# Patient Record
Sex: FEMALE | Race: WHITE | Employment: FULL TIME | URBAN - METROPOLITAN AREA
[De-identification: names, ages, dates, MRNs, and addresses within clinical notes are randomized per-mention and may not be internally consistent; named-entity substitution may affect disease eponyms.]

---

## 2018-02-01 LAB — DIABETIC RETINOPATHY: ABNORMAL

## 2018-04-25 LAB
AVERAGE GLUCOSE: ABNORMAL
HBA1C MFR BLD: 10.9 %
T4 FREE: 0.92
TSH SERPL DL<=0.05 MIU/L-ACNC: 2.65 UIU/ML
VITAMIN B-12: 272

## 2018-05-03 ENCOUNTER — TELEPHONE (OUTPATIENT)
Dept: ENDOCRINOLOGY | Age: 63
End: 2018-05-03

## 2018-05-03 ENCOUNTER — OFFICE VISIT (OUTPATIENT)
Dept: ENDOCRINOLOGY | Age: 63
End: 2018-05-03

## 2018-05-03 VITALS
WEIGHT: 257.6 LBS | SYSTOLIC BLOOD PRESSURE: 110 MMHG | OXYGEN SATURATION: 98 % | HEIGHT: 61 IN | BODY MASS INDEX: 48.64 KG/M2 | DIASTOLIC BLOOD PRESSURE: 68 MMHG | HEART RATE: 85 BPM

## 2018-05-03 DIAGNOSIS — Z79.4 TYPE 2 DIABETES MELLITUS WITH DIABETIC POLYNEUROPATHY, WITH LONG-TERM CURRENT USE OF INSULIN (HCC): Primary | ICD-10-CM

## 2018-05-03 DIAGNOSIS — M85.859 OSTEOPENIA OF THIGH, UNSPECIFIED LATERALITY: ICD-10-CM

## 2018-05-03 DIAGNOSIS — E11.42 TYPE 2 DIABETES MELLITUS WITH DIABETIC POLYNEUROPATHY, WITH LONG-TERM CURRENT USE OF INSULIN (HCC): Primary | ICD-10-CM

## 2018-05-03 PROCEDURE — 99215 OFFICE O/P EST HI 40 MIN: CPT | Performed by: INTERNAL MEDICINE

## 2018-05-03 PROCEDURE — G0444 DEPRESSION SCREEN ANNUAL: HCPCS | Performed by: INTERNAL MEDICINE

## 2018-05-03 RX ORDER — FERROUS SULFATE TAB EC 324 MG (65 MG FE EQUIVALENT) 324 (65 FE) MG
TABLET DELAYED RESPONSE ORAL
COMMUNITY

## 2018-05-03 RX ORDER — OMEPRAZOLE 20 MG/1
20 CAPSULE, DELAYED RELEASE ORAL DAILY
COMMUNITY
Start: 2017-05-16

## 2018-05-03 RX ORDER — FLUTICASONE FUROATE AND VILANTEROL 100; 25 UG/1; UG/1
POWDER RESPIRATORY (INHALATION)
COMMUNITY
Start: 2017-12-27

## 2018-05-03 RX ORDER — LEVOTHYROXINE SODIUM 112 UG/1
112 TABLET ORAL
Qty: 30 TABLET | Refills: 5 | Status: SHIPPED | OUTPATIENT
Start: 2018-05-03 | End: 2018-09-06 | Stop reason: SDUPTHER

## 2018-05-03 RX ORDER — ALBUTEROL SULFATE 2.5 MG/3ML
2.5 SOLUTION RESPIRATORY (INHALATION)
COMMUNITY
Start: 2018-01-16

## 2018-05-03 RX ORDER — ARIPIPRAZOLE 10 MG/1
10 TABLET ORAL
COMMUNITY
End: 2018-09-06

## 2018-05-03 RX ORDER — ATORVASTATIN CALCIUM 20 MG/1
20 TABLET, FILM COATED ORAL
COMMUNITY
Start: 2017-12-05

## 2018-05-03 RX ORDER — PEN NEEDLE, DIABETIC 30 GX3/16"
NEEDLE, DISPOSABLE MISCELLANEOUS
COMMUNITY
Start: 2016-10-04 | End: 2018-08-03 | Stop reason: SDUPTHER

## 2018-05-03 RX ORDER — ALBUTEROL SULFATE 90 UG/1
2 AEROSOL, METERED RESPIRATORY (INHALATION)
COMMUNITY
Start: 2017-09-29

## 2018-05-03 RX ORDER — DIAZEPAM 2 MG/1
2 TABLET ORAL PRN
COMMUNITY

## 2018-05-03 ASSESSMENT — PATIENT HEALTH QUESTIONNAIRE - PHQ9
6. FEELING BAD ABOUT YOURSELF - OR THAT YOU ARE A FAILURE OR HAVE LET YOURSELF OR YOUR FAMILY DOWN: 1
4. FEELING TIRED OR HAVING LITTLE ENERGY: 3
7. TROUBLE CONCENTRATING ON THINGS, SUCH AS READING THE NEWSPAPER OR WATCHING TELEVISION: 1
5. POOR APPETITE OR OVEREATING: 0
1. LITTLE INTEREST OR PLEASURE IN DOING THINGS: 3
8. MOVING OR SPEAKING SO SLOWLY THAT OTHER PEOPLE COULD HAVE NOTICED. OR THE OPPOSITE, BEING SO FIGETY OR RESTLESS THAT YOU HAVE BEEN MOVING AROUND A LOT MORE THAN USUAL: 0
SUM OF ALL RESPONSES TO PHQ QUESTIONS 1-9: 11
9. THOUGHTS THAT YOU WOULD BE BETTER OFF DEAD, OR OF HURTING YOURSELF: 0
10. IF YOU CHECKED OFF ANY PROBLEMS, HOW DIFFICULT HAVE THESE PROBLEMS MADE IT FOR YOU TO DO YOUR WORK, TAKE CARE OF THINGS AT HOME, OR GET ALONG WITH OTHER PEOPLE: 1
SUM OF ALL RESPONSES TO PHQ9 QUESTIONS 1 & 2: 6
2. FEELING DOWN, DEPRESSED OR HOPELESS: 3
3. TROUBLE FALLING OR STAYING ASLEEP: 0

## 2018-05-03 ASSESSMENT — ENCOUNTER SYMPTOMS: VISUAL CHANGE: 0

## 2018-08-03 RX ORDER — PEN NEEDLE, DIABETIC 30 GX3/16"
NEEDLE, DISPOSABLE MISCELLANEOUS
Qty: 150 EACH | Refills: 3 | Status: SHIPPED | OUTPATIENT
Start: 2018-08-03 | End: 2020-05-04 | Stop reason: SDUPTHER

## 2018-08-28 ENCOUNTER — TELEPHONE (OUTPATIENT)
Dept: ENDOCRINOLOGY | Age: 63
End: 2018-08-28

## 2018-08-31 LAB
AVERAGE GLUCOSE: ABNORMAL
AVERAGE GLUCOSE: ABNORMAL
CHOLESTEROL, TOTAL: 146 MG/DL
CHOLESTEROL/HDL RATIO: 3.4
HBA1C MFR BLD: 9.4 %
HBA1C MFR BLD: 9.4 %
HDLC SERPL-MCNC: 43 MG/DL (ref 35–70)
LDL CHOLESTEROL CALCULATED: 85 MG/DL (ref 0–160)
TRIGL SERPL-MCNC: 90 MG/DL
TSH SERPL DL<=0.05 MIU/L-ACNC: 0.92 UIU/ML
VITAMIN D 25-HYDROXY: 38
VITAMIN D2, 25 HYDROXY: NORMAL
VITAMIN D3,25 HYDROXY: NORMAL
VLDLC SERPL CALC-MCNC: ABNORMAL MG/DL

## 2018-09-06 ENCOUNTER — OFFICE VISIT (OUTPATIENT)
Dept: ENDOCRINOLOGY | Age: 63
End: 2018-09-06

## 2018-09-06 VITALS
BODY MASS INDEX: 47.65 KG/M2 | HEIGHT: 61 IN | WEIGHT: 252.4 LBS | SYSTOLIC BLOOD PRESSURE: 113 MMHG | DIASTOLIC BLOOD PRESSURE: 74 MMHG | HEART RATE: 77 BPM

## 2018-09-06 DIAGNOSIS — E06.3 HYPOTHYROIDISM DUE TO HASHIMOTO'S THYROIDITIS: ICD-10-CM

## 2018-09-06 DIAGNOSIS — I10 ESSENTIAL HYPERTENSION: ICD-10-CM

## 2018-09-06 DIAGNOSIS — E03.8 HYPOTHYROIDISM DUE TO HASHIMOTO'S THYROIDITIS: ICD-10-CM

## 2018-09-06 DIAGNOSIS — E78.2 MIXED HYPERLIPIDEMIA: ICD-10-CM

## 2018-09-06 PROCEDURE — 3017F COLORECTAL CA SCREEN DOC REV: CPT | Performed by: INTERNAL MEDICINE

## 2018-09-06 PROCEDURE — G8417 CALC BMI ABV UP PARAM F/U: HCPCS | Performed by: INTERNAL MEDICINE

## 2018-09-06 PROCEDURE — 3046F HEMOGLOBIN A1C LEVEL >9.0%: CPT | Performed by: INTERNAL MEDICINE

## 2018-09-06 PROCEDURE — 1036F TOBACCO NON-USER: CPT | Performed by: INTERNAL MEDICINE

## 2018-09-06 PROCEDURE — 99214 OFFICE O/P EST MOD 30 MIN: CPT | Performed by: INTERNAL MEDICINE

## 2018-09-06 PROCEDURE — G8427 DOCREV CUR MEDS BY ELIG CLIN: HCPCS | Performed by: INTERNAL MEDICINE

## 2018-09-06 PROCEDURE — 2022F DILAT RTA XM EVC RTNOPTHY: CPT | Performed by: INTERNAL MEDICINE

## 2018-09-06 RX ORDER — LEVOTHYROXINE SODIUM 112 UG/1
112 TABLET ORAL
Qty: 30 TABLET | Refills: 5 | Status: SHIPPED | OUTPATIENT
Start: 2018-09-06 | End: 2020-05-25

## 2018-09-06 ASSESSMENT — ENCOUNTER SYMPTOMS: VISUAL CHANGE: 0

## 2018-09-06 NOTE — PROGRESS NOTES
Hilario Malik is a 61 y.o. female Pt seen for T2 DM diagnosed in 6280 she has complication retinopathy , neuropathy  her initial presentation to me was confusion and mental status changes due to metabolic hyperglycemic changes    Prisca Perez has tried oral hypoglycemic agents in the past including  metformin ,Glipizide and Januvia ,she was on a Insulin 70/30 combination and ended up having frequent  hypoglycemic reactions --- her confusion on initial presentation  resolved after initiation of insulin basal bolus form in January 2010     Pt also has hypothyroidism & is on Lt4 daily --she has no family hx of thyroid disease or thyroid cancer she denies any radiation exposure. Prisca Perez also has hypertension and CAD and follows with Cardiology     She had lung infection in sept 2017 and she was on steroids and that messed up her control too but she admits she has not been watching her diet either   Current diabetic medications include: basal bolus insulin regimen     INTERIM:    Diabetes   She presents for her follow-up diabetic visit. She has type 2 diabetes mellitus. No MedicAlert identification noted. The initial diagnosis of diabetes was made 25 years ago. Her disease course has been worsening. Hypoglycemia symptoms include nervousness/anxiousness, sweats and tremors. Pertinent negatives for diabetes include no foot ulcerations, no polydipsia, no polyphagia, no polyuria, no visual change, no weakness and no weight loss. There are no hypoglycemic complications. Symptoms are worsening. Diabetic complications include heart disease, peripheral neuropathy and retinopathy. Risk factors for coronary artery disease include diabetes mellitus, dyslipidemia, post-menopausal and hypertension. Current diabetic treatment includes intensive insulin program. She is compliant with treatment most of the time. Her weight is stable. She is following a generally healthy diet. Meal planning includes avoidance of concentrated sweets.  She has had Social History Main Topics    Smoking status: Never Smoker    Smokeless tobacco: Never Used    Alcohol use No    Drug use: No    Sexual activity: Not on file     Other Topics Concern    Not on file     Social History Narrative    No narrative on file     Family History   Problem Relation Age of Onset    Diabetes Father     High Blood Pressure Father     Breast Cancer Sister     High Blood Pressure Sister     Diabetes Maternal Grandmother      Current Outpatient Prescriptions   Medication Sig Dispense Refill    insulin glargine (BASAGLAR KWIKPEN) 100 UNIT/ML injection pen Inject 50 Units into the skin nightly 5 pen 5    insulin lispro (HUMALOG) 100 UNIT/ML pen Inject 10 Units into the skin 3 times daily 5 pen 5    levothyroxine (SYNTHROID) 112 MCG tablet Take 1 tablet by mouth every morning (before breakfast) 30 tablet 5    Insulin Pen Needle (PEN NEEDLES) 31G X 5 MM MISC 4 times daily 150 each 3    verapamil (CALAN SR) 180 MG extended release tablet Take 180 mg by mouth 2 times daily      omeprazole (PRILOSEC) 20 MG delayed release capsule Take 20 mg by mouth daily      Cholecalciferol (VITAMIN D3 PO) Take 50,000 Units by mouth once a week      ferrous sulfate 324 (65 Fe) MG EC tablet Take 2 tablets daily      diazepam (VALIUM) 2 MG tablet Take 2 mg by mouth as needed. Kat Goes fluticasone-vilanterol (BREO ELLIPTA) 100-25 MCG/INH AEPB inhaler INHALE ONE DOSE BY MOUTH DAILY      atorvastatin (LIPITOR) 20 MG tablet Take 20 mg by mouth      aspirin 81 MG tablet Take 81 mg by mouth daily      albuterol sulfate  (90 Base) MCG/ACT inhaler Inhale 2 puffs into the lungs      albuterol (PROVENTIL) (2.5 MG/3ML) 0.083% nebulizer solution Inhale 2.5 mg into the lungs      glucose blood VI test strips (FREESTYLE LITE) strip 4 times daily 150 each 3    cyanocobalamin 2000 MCG tablet Take 2,000 mcg by mouth daily.  DULoxetine (CYMBALTA) 60 MG capsule Take 60 mg by mouth 2 times daily.      

## 2018-09-12 ENCOUNTER — TELEPHONE (OUTPATIENT)
Dept: ENDOCRINOLOGY | Age: 63
End: 2018-09-12

## 2018-11-20 RX ORDER — LEVOTHYROXINE SODIUM 112 UG/1
TABLET ORAL
Qty: 30 TABLET | Refills: 4 | Status: SHIPPED | OUTPATIENT
Start: 2018-11-20 | End: 2019-04-20 | Stop reason: SDUPTHER

## 2019-01-01 LAB — DIABETIC RETINOPATHY: POSITIVE

## 2019-01-10 ENCOUNTER — OFFICE VISIT (OUTPATIENT)
Dept: ENDOCRINOLOGY | Age: 64
End: 2019-01-10
Payer: COMMERCIAL

## 2019-01-10 VITALS
WEIGHT: 252.8 LBS | HEART RATE: 69 BPM | SYSTOLIC BLOOD PRESSURE: 133 MMHG | HEIGHT: 61 IN | DIASTOLIC BLOOD PRESSURE: 66 MMHG | BODY MASS INDEX: 47.73 KG/M2

## 2019-01-10 DIAGNOSIS — I10 ESSENTIAL HYPERTENSION: ICD-10-CM

## 2019-01-10 DIAGNOSIS — E78.2 MIXED HYPERLIPIDEMIA: ICD-10-CM

## 2019-01-10 DIAGNOSIS — N18.4 CKD (CHRONIC KIDNEY DISEASE) STAGE 4, GFR 15-29 ML/MIN (HCC): ICD-10-CM

## 2019-01-10 PROCEDURE — 99214 OFFICE O/P EST MOD 30 MIN: CPT | Performed by: INTERNAL MEDICINE

## 2019-01-10 PROCEDURE — 3046F HEMOGLOBIN A1C LEVEL >9.0%: CPT | Performed by: INTERNAL MEDICINE

## 2019-01-10 PROCEDURE — 2022F DILAT RTA XM EVC RTNOPTHY: CPT | Performed by: INTERNAL MEDICINE

## 2019-01-10 PROCEDURE — G8484 FLU IMMUNIZE NO ADMIN: HCPCS | Performed by: INTERNAL MEDICINE

## 2019-01-10 PROCEDURE — G8417 CALC BMI ABV UP PARAM F/U: HCPCS | Performed by: INTERNAL MEDICINE

## 2019-01-10 PROCEDURE — 1036F TOBACCO NON-USER: CPT | Performed by: INTERNAL MEDICINE

## 2019-01-10 PROCEDURE — G8427 DOCREV CUR MEDS BY ELIG CLIN: HCPCS | Performed by: INTERNAL MEDICINE

## 2019-01-10 PROCEDURE — 3017F COLORECTAL CA SCREEN DOC REV: CPT | Performed by: INTERNAL MEDICINE

## 2019-01-10 ASSESSMENT — ENCOUNTER SYMPTOMS: VISUAL CHANGE: 0

## 2019-01-21 ENCOUNTER — TELEPHONE (OUTPATIENT)
Dept: ENDOCRINOLOGY | Age: 64
End: 2019-01-21

## 2019-01-21 DIAGNOSIS — R79.89 ELEVATED SERUM CREATININE: Primary | ICD-10-CM

## 2019-04-22 RX ORDER — LEVOTHYROXINE SODIUM 112 UG/1
TABLET ORAL
Qty: 30 TABLET | Refills: 3 | Status: SHIPPED | OUTPATIENT
Start: 2019-04-22 | End: 2019-07-27 | Stop reason: SDUPTHER

## 2019-06-05 ENCOUNTER — PROCEDURE VISIT (OUTPATIENT)
Dept: ENDOCRINOLOGY | Age: 64
End: 2019-06-05
Payer: COMMERCIAL

## 2019-06-05 ENCOUNTER — OFFICE VISIT (OUTPATIENT)
Dept: ENDOCRINOLOGY | Age: 64
End: 2019-06-05
Payer: COMMERCIAL

## 2019-06-05 VITALS
SYSTOLIC BLOOD PRESSURE: 124 MMHG | WEIGHT: 244.8 LBS | DIASTOLIC BLOOD PRESSURE: 62 MMHG | HEART RATE: 71 BPM | BODY MASS INDEX: 46.22 KG/M2 | HEIGHT: 61 IN | OXYGEN SATURATION: 99 %

## 2019-06-05 VITALS
WEIGHT: 244 LBS | SYSTOLIC BLOOD PRESSURE: 124 MMHG | OXYGEN SATURATION: 99 % | HEIGHT: 61 IN | HEART RATE: 71 BPM | BODY MASS INDEX: 46.07 KG/M2 | DIASTOLIC BLOOD PRESSURE: 62 MMHG

## 2019-06-05 DIAGNOSIS — E66.9 OBESITY (BMI 35.0-39.9 WITHOUT COMORBIDITY): ICD-10-CM

## 2019-06-05 DIAGNOSIS — E04.2 MULTINODULAR GOITER: ICD-10-CM

## 2019-06-05 DIAGNOSIS — N18.30 CKD (CHRONIC KIDNEY DISEASE) STAGE 3, GFR 30-59 ML/MIN (HCC): ICD-10-CM

## 2019-06-05 DIAGNOSIS — E78.2 MIXED HYPERLIPIDEMIA: ICD-10-CM

## 2019-06-05 DIAGNOSIS — I10 ESSENTIAL HYPERTENSION: ICD-10-CM

## 2019-06-05 PROCEDURE — 3017F COLORECTAL CA SCREEN DOC REV: CPT | Performed by: INTERNAL MEDICINE

## 2019-06-05 PROCEDURE — 3046F HEMOGLOBIN A1C LEVEL >9.0%: CPT | Performed by: INTERNAL MEDICINE

## 2019-06-05 PROCEDURE — 1036F TOBACCO NON-USER: CPT | Performed by: INTERNAL MEDICINE

## 2019-06-05 PROCEDURE — G8428 CUR MEDS NOT DOCUMENT: HCPCS | Performed by: INTERNAL MEDICINE

## 2019-06-05 PROCEDURE — G8417 CALC BMI ABV UP PARAM F/U: HCPCS | Performed by: INTERNAL MEDICINE

## 2019-06-05 PROCEDURE — 99214 OFFICE O/P EST MOD 30 MIN: CPT | Performed by: INTERNAL MEDICINE

## 2019-06-05 PROCEDURE — 2022F DILAT RTA XM EVC RTNOPTHY: CPT | Performed by: INTERNAL MEDICINE

## 2019-06-05 RX ORDER — VALSARTAN 80 MG/1
TABLET ORAL
COMMUNITY
Start: 2019-05-22 | End: 2020-08-13

## 2019-06-05 NOTE — PROGRESS NOTES
Thyroid Ultrasound Examination    Technique: A sonogram of the thyroid gland was performed assessing gray-scale appearance and color doppler flow. Comparison:   5/2017 done at [address] [place] [address]     Findings    Thyroid is noted to be homogenous in texture . Measurements are in the following order longitudinal x Anteroposterior x transverse  in cm     The Right lobe is normal in size measuring [3.76 ] x [1.3 to ] x [ 1.41] cm. volume of 3.35 ML    The Left  lobe is normal in size measuring [3.24 ] x [0.77 ] x [ 1.73] cm. In the Right thyroid lobe there are 2 cysts noted which are both approximately 0.3 centimeter , more close to isthmus there is a nodule  Is solid ,  oval shape, measuring 0.44x 0.47cm . Margins are regular , there is no halo, possible echogenic foci, and grade 1 flow/Doppler. In the left lobe there are 2 nodules appreciated  Superiorly and laterally is a solid hypoechoic nodule measuring 0.63x 0.39x 0.65. It has irregular margins with no microcalcifications identified it has grade 0 low on color Doppler. No suspicious features---previous measurements for this nodule   \"In the left lobe superiorly in the mid lobe there is a hypoecheoic nodule with smooth margins measuring 0.58 x0.65 x0.41 cm with grade zero floe In octo 2015 it measured 0.49 x0.40 x 0.26 cm there is  a echogenic focus in this nodule, grade 0 flow smooth margins in sept 2014 0.37 x0.31x0.19)\"     Inferior and medial to this nodule is another solid hypoechoic/isochoeic nodule with irregular margins, no calcification and grade 1 color flow Doppler---Previous measurements \"Inferiorly ( in the mid lobe ) close to trachea is the dominant nodule  It has smooth borders with grade 0 flow May 2017 >. 0.63 x0.57 x0.37 cm >>oc 2015 >> 0.74x 0.65 x 0.35 cm ( previous in sept 2014 >> 0.57x0.56x0.37 cm ) this has increased slightly in size \"    Impression     --- Normal sized thyroid gland. --In the left lobe there are 2 solid nodules.  Both of them are stable when compared to the previous imaging in 2017. There are no suspicious features at this time. We will consider imaging again based on clinical findings. There is no family history of thyroid cancer no history of radiation exposure.

## 2019-06-12 ASSESSMENT — ENCOUNTER SYMPTOMS: VISUAL CHANGE: 0

## 2019-06-12 NOTE — PROGRESS NOTES
Jonna Vargas is a 59 y.o. female seen for T2 DM diagnosed in 5661 she has complication retinopathy , neuropathy  her initial presentation to me was confusion and mental status changes due to metabolic hyperglycemic changes    Desmond Tijerina has tried oral hypoglycemic agents in the past including  metformin ,Glipizide and Januvia ,she was on a Insulin 70/30 combination and ended up having frequent  hypoglycemic reactions --- her confusion on initial presentation  resolved after initiation of insulin basal bolus form in January 2010     Pt also has hypothyroidism & is on Lt4 daily --she has no family hx of thyroid disease or thyroid cancer she denies any radiation exposure. Desmond Tijerina also has hypertension and CAD and follows with Cardiology     She had lung infection in sept 2017 and she was on steroids and that messed up her control too but she admits she has not been watching her diet either   Current diabetic medications include: basal bolus insulin regimen     INTERIM:    Diabetes   She presents for her follow-up diabetic visit. She has type 2 diabetes mellitus. No MedicAlert identification noted. The initial diagnosis of diabetes was made 25 years ago. Her disease course has been worsening. Hypoglycemia symptoms include nervousness/anxiousness, sweats and tremors. Pertinent negatives for diabetes include no foot ulcerations, no polydipsia, no polyphagia, no polyuria, no visual change, no weakness and no weight loss. There are no hypoglycemic complications. Symptoms are worsening. Diabetic complications include heart disease, peripheral neuropathy and retinopathy. Risk factors for coronary artery disease include diabetes mellitus, dyslipidemia, post-menopausal and hypertension. Current diabetic treatment includes intensive insulin program. She is compliant with treatment most of the time. Her weight is stable. She is following a generally healthy diet. Meal planning includes avoidance of concentrated sweets.  She has had a previous visit with a dietitian. She rarely participates in exercise. Her home blood glucose trend is increasing steadily. Her breakfast blood glucose is taken between 7-8 am. Her breakfast blood glucose range is generally  mg/dl. Her lunch blood glucose is taken between 12-1 pm. Her lunch blood glucose range is generally >200 mg/dl. Her dinner blood glucose is taken between 7-8 pm. Her dinner blood glucose range is generally >200 mg/dl. An ACE inhibitor/angiotensin II receptor blocker is being taken. She does not see a podiatrist.Eye exam is current.       No recent change in her symptoms she has been watching her diet and has noted improvement in her fingersticks in the last few weeks  Weight trend: stable  Prior visit with dietician: no  Current diet: on average, 3 meals per day  Current exercise: rare    Has there been any hospitalization, surgery or major illness since the last visit? No   Has there been any new school, family or social problems since the last visit? No  Has there been any new family history of members with diabetes, heart disease, stroke, or endocrine related problems since the last visit?   No    Past Medical History:   Diagnosis Date    Anemia     Anxiety     Arthritis     Asthma     Chronic renal [facility], stage [facility]     CKD (chronic kidney disease) stage 3, GFR 30-59 ml/min ([facility]) 6/5/2019    Depression     Diabetes mellitus ([address]     Hyperlipidemia     Hypertension     Hypothyroidism     Intercostal pain     Iron deficiency anemia, unspecified     Neuropathy     Obesity     Osteoarthritis     Retinopathy     bilat eyes     Thyroid disease       Patient Active Problem List   Diagnosis    Uncontrolled type 2 diabetes mellitus with complication, with long-term current use of insulin ([facility])    Essential hypertension    Mixed hyperlipidemia    Obesity (BMI 35.0-39.9 without comorbidity)    CKD (chronic kidney disease) stage 3, GFR 30-59 ml/min ([facility])     Past Surgical History:   Procedure Laterality Date    CHOLECYSTECTOMY      EYE SURGERY  2018    retinopathy     HERNIA REPAIR      OTHER SURGICAL HISTORY Right     thoracic surgery on rt side for empyema    OTHER SURGICAL HISTORY  2018    nerve block- back     OTHER SURGICAL HISTORY  03/2019    ablation back bilateral     TONSILLECTOMY       Social History     Socioeconomic History    Marital status:      Spouse name: Not on file    Number of children: Not on file    Years of education: Not on file    Highest education level: Not on file   Occupational History    Not on file   Social Needs    Financial resource strain: Not on file    Food insecurity:     Worry: Not on file     Inability: Not on file    Transportation needs:     Medical: Not on file     Non-medical: Not on file   Tobacco Use    Smoking status: Never Smoker    Smokeless tobacco: Never Used   Substance and Sexual Activity    Alcohol use: No    Drug use: No    Sexual activity: Not on file   Lifestyle    Physical activity:     Days per week: Not on file     Minutes per session: Not on file    Stress: Not on file   Relationships    Social connections:     Talks on phone: Not on file     Gets together: Not on file     Attends Hinduism service: Not on file     Active member of club or organization: Not on file     Attends meetings of clubs or organizations: Not on file     Relationship status: Not on file    Intimate partner violence:     Fear of current or ex partner: Not on file     Emotionally abused: Not on file     Physically abused: Not on file     Forced sexual activity: Not on file   Other Topics Concern    Not on file   Social History Narrative    Not on file     Family History   Problem Relation Age of Onset    Diabetes Father     High Blood Pressure Father     Breast Cancer Sister     High Blood Pressure Sister     Diabetes Maternal Grandmother      Current Outpatient Medications   Medication Sig Dispense Refill  valsartan (DIOVAN) 80 MG tablet 1 tab daily      levothyroxine (SYNTHROID) 112 MCG tablet TAKE ONE TABLET BY MOUTH EVERY MORNING BEFORE BREAKFAST 30 tablet 3    insulin glargine (BASAGLAR KWIKPEN) 100 UNIT/ML injection pen Inject 50 Units into the skin nightly 5 pen 5    insulin lispro (HUMALOG) 100 UNIT/ML pen Inject 10 Units into the skin 3 times daily 5 pen 5    levothyroxine (SYNTHROID) 112 MCG tablet Take 1 tablet by mouth every morning (before breakfast) 30 tablet 5    Insulin Pen Needle (PEN NEEDLES) 31G X 5 MM MISC 4 times daily 150 each 3    verapamil (CALAN SR) 180 MG extended release tablet Take 180 mg by mouth 2 times daily      omeprazole (PRILOSEC) 20 MG delayed release capsule Take 20 mg by mouth daily      Cholecalciferol (VITAMIN D3 PO) Take 50,000 Units by mouth once a week      ferrous sulfate 324 (65 Fe) MG EC tablet Take 2 tablets daily      diazepam (VALIUM) 2 MG tablet Take 2 mg by mouth as needed. Raejean Blank fluticasone-vilanterol (BREO ELLIPTA) 100-25 MCG/INH AEPB inhaler INHALE ONE DOSE BY MOUTH DAILY      atorvastatin (LIPITOR) 20 MG tablet Take 20 mg by mouth      aspirin 81 MG tablet Take 81 mg by mouth daily      albuterol sulfate  (90 Base) MCG/ACT inhaler Inhale 2 puffs into the lungs      albuterol (PROVENTIL) (2.5 MG/3ML) 0.083% nebulizer solution Inhale 2.5 mg into the lungs      glucose blood VI test strips (FREESTYLE LITE) strip 4 times daily 150 each 3    cyanocobalamin 2000 MCG tablet Take 2,000 mcg by mouth daily.  DULoxetine (CYMBALTA) 60 MG capsule Take 60 mg by mouth 2 times daily.  fexofenadine (ALLEGRA ALLERGY) 180 MG tablet Take 180 mg by mouth daily.  furosemide (LASIX) 40 MG tablet Take 40 mg by mouth daily.  Multiple Vitamins-Minerals (MULTIVITAMIN PO) Take  by mouth.  pregabalin (LYRICA) 100 MG capsule Take 100 mg by mouth daily. .       No current facility-administered medications for this visit.       No Known Allergies  Family Status   Relation Name Status    Father  (Not Specified)    Sister  (Not Specified)    MGM  (Not Specified)       Review of Systems  Constitutional: no fatigue, no fever, no recent weight gain, no recent weight loss, no changes in appetite  Eyes: no eye pain, no change in vision, no eye redness, no eye irritation, no double vision  Ears, nose, throat: no nasal congestion, no sore throat, no earache, no decrease in hearing, no hoarseness, no dry mouth, no sinus problems, no difficulty swallowing, no neck lumps, no dental problems, no mouth sores, no ringing in ears  Pulmonary: no shortness of breath, no wheezing, no dyspnea on exertion, no cough  Cardiovascular: no chest pain, no lower extremity edema, no orthopnea, no intermittent leg claudication, no palpitations  Gastrointestinal: no abdominal pain, no nausea, no vomiting, no diarrhea, no constipation, no dysphagia, no heartburn, no bloating  Genitourinary: no dysuria, no urinary incontinence, no urinary hesitancy, no urinary frequency, no feelings of urinary urgency, no nocturia  Musculoskeletal: no joint swelling, no joint stiffness, no joint pain, no muscle cramps, no muscle pain, no bone pain, no fractures  Integument/Breast: hair loss, but no skin rashes, no skin lesions, no itching, no dry skin, no breast pain, no breast mass, no skin hives, no skin discoloration, no nipple discharge  Neurological: no numbness, no tingling, no weakness, no confusion, no headaches, no dizziness, no fainting, no tremors, no decrease in memory, no balance problems  Psychiatric: no anxiety, no depression, no insomnia, no change in personality, no emotional problems, no stress  Hematologic/Lymphatic: no tendency for easy bleeding, no swollen lymph nodes, no tendency for easy bruising  Immunology: no seasonal allergies, no frequent infections, no frequent illnesses  Endocrine: no temperature intolerance no hirsutism, no hot flashes    OBJECTIVE:  /62 left lobe superiorly in the mid lobe there is a hypoecheoic nodule with smooth margins measuring 0.58 x0.65 x0.41 cm with grade zero floe In octo 2015 it measured 0.49 x0.40 x 0.26 cm there is  a echogenic focus in this nodule, grade 0 flow smooth margins in sept 2014 0.37 x0.31x0.19)  Inferiorly ( in the mid lobe ) close to trachea is the dominant nodule  It has smooth borders with grade 0 flow and measures 0.63 x0.57 x0.37 cm >>oc 2015 >> 0.74x 0.65 x 0.35 cm ( previous in sept 2014 >> 0.57x0.56x0.37 cm ) this has increased slightly in size     IMPRESSION    1. Normal sized thyroid gland.    2. Left lobe subcentimeter dominant  nodule which has stayed stable   3 . All nodules less than 1 cm   Will continue to monitor         Lab Results   Component Value Date    LABA1C 9.4 08/31/2018    LABA1C 9.4 08/31/2018    LABA1C 10.9 04/25/2018         ASSESSMENT/PLAN:    T2DM with triopathy uncontrolled   A1c was 7.8>>8.5>>7.3>>8.6---control worsened Since last visit>>8.5 >>10.9>>9.4>>8.5>>9.4>>8.7    -She is on Basaglar 40  Units >>> novolog 6 with sliding scale. Better blood glucose in the last few weeks   Discussed Diet modification, Pt has been taking insulin as prescribed and denies having any unexplained hypoglycemic reactions. Pt is able to recognize hypoglycemia and correct it, denies any worsening of neuropathy  Hypoglycemia protocol reviewed in detail with patient Patient was advised to carry glucose tablets .  She lives by herself so she was not able to use Glucagon   Test 4 times a day      Health Maintenance Review:  Last Eye Exam:sept 2018 she had laser done for retinopathy again in 2018   Lipids: Normal ldl   Microalbumin/Creatinine Ratio: Pt has CKD and follows with Dr Doris Marrufo exam marked decrease in vibration sensation and monofilament was intact --- may 2018      Reviewed ABCs of diabetes management (respective goals in parentheses): A1C (<7), blood pressure (<130/80), and cholesterol (LDL <100).      Hypothyroidism   . Levothyroxine 112 mcg daily TSH one in June 2019  Umm Becker is currently biochemicaly  and clinically euthyroid on the  current dose of thyroid hormone replacement . Patient  is advised to take her thyroid hormone supplement on empty stomach without any concomitant Iron or calcium supplement . VIt D level 32 >>28>>35  She has been on 50,0000 Weekly dosing   Denies any kidney stones   She is on replacement   Most recent level in June 2019 is 35    THYROID NODULE LEFT LOBE 6 mm solid hypoechoeic nodule   Thyroid ultrasound stable May 2017 she will be scheduled to have a repeat thyroid ultrasound done with me at follow-up      OSTEOPENIA dex scan was in 5/2016 repeat in 2018    she was given orders to have repeat DEXA scan done now     CKD   Follows with Dr Hanh Olmos   Her creatinine is 1.8> 1.4 >>1.99  She will call Dr Cami Guadarrama for abnormal labs     Hyperlipidemia on meds follows with PCP on statins   She is on lipitor only ---ldl 86 >>100>>85  She was on fenofibrate in the past which I stopped in march 2016 due to elevated CPK   CK was 65 in sep 2016     HYPERTENSION on meds controlled   Continue with current regimen     --- CHF   She is on Lasix   Follows with [facility]ilogy she follows with   She has pulmonary hypertension     Asthma stable   ---Follows with Dr. Shawn Boo current regimen       Reviewed and/or ordered clinical lab results yes   Reviewed and/or ordered radiology tests Yes  Reviewed and/or ordered other diagnostic tests yes   Made a decision to obtain old records yes   Reviewed and summarized old records yes     Umm Becker was counseled regarding symptoms of current diagnosis, course and complications of disease if inadequately treated, side effects of medications, diagnosis, treatment options, and prognosis, risks, benefits, complications, and alternatives of treatment, labs, imaging and other studies and treatment targets and goals.   She understands instructions and counseling    Total time spent counseling 25 min  , more than 50 % of this was spent on face to face counseling    These diagnosis were discussed and reviewed with the patient including the advantages of drug therapy. She was counseled at this visit on the following: diabetes complication prevention and foot care. Return in about 3 months (around 9/5/2019).

## 2019-07-23 RX ORDER — INSULIN GLARGINE 100 [IU]/ML
INJECTION, SOLUTION SUBCUTANEOUS
Qty: 15 PEN | Refills: 0 | Status: SHIPPED | OUTPATIENT
Start: 2019-07-23 | End: 2019-08-18 | Stop reason: SDUPTHER

## 2019-07-29 RX ORDER — LEVOTHYROXINE SODIUM 112 UG/1
TABLET ORAL
Qty: 30 TABLET | Refills: 2 | Status: SHIPPED | OUTPATIENT
Start: 2019-07-29 | End: 2019-11-17 | Stop reason: SDUPTHER

## 2019-08-19 RX ORDER — INSULIN LISPRO 100 [IU]/ML
INJECTION, SOLUTION INTRAVENOUS; SUBCUTANEOUS
Qty: 15 PEN | Refills: 2 | Status: SHIPPED | OUTPATIENT
Start: 2019-08-19 | End: 2019-10-08 | Stop reason: SDUPTHER

## 2019-08-19 RX ORDER — INSULIN GLARGINE 100 [IU]/ML
INJECTION, SOLUTION SUBCUTANEOUS
Qty: 15 PEN | Refills: 2 | Status: SHIPPED | OUTPATIENT
Start: 2019-08-19 | End: 2020-07-07

## 2019-10-09 ENCOUNTER — OFFICE VISIT (OUTPATIENT)
Dept: ENDOCRINOLOGY | Age: 64
End: 2019-10-09
Payer: COMMERCIAL

## 2019-10-09 VITALS
WEIGHT: 237.8 LBS | SYSTOLIC BLOOD PRESSURE: 136 MMHG | BODY MASS INDEX: 44.89 KG/M2 | HEART RATE: 75 BPM | DIASTOLIC BLOOD PRESSURE: 78 MMHG | OXYGEN SATURATION: 98 % | HEIGHT: 61 IN

## 2019-10-09 DIAGNOSIS — N18.30 CKD (CHRONIC KIDNEY DISEASE) STAGE 3, GFR 30-59 ML/MIN (HCC): ICD-10-CM

## 2019-10-09 DIAGNOSIS — E66.9 OBESITY (BMI 35.0-39.9 WITHOUT COMORBIDITY): ICD-10-CM

## 2019-10-09 DIAGNOSIS — Z78.0 POSTMENOPAUSAL: ICD-10-CM

## 2019-10-09 DIAGNOSIS — E78.2 MIXED HYPERLIPIDEMIA: ICD-10-CM

## 2019-10-09 DIAGNOSIS — I10 ESSENTIAL HYPERTENSION: ICD-10-CM

## 2019-10-09 PROCEDURE — G8484 FLU IMMUNIZE NO ADMIN: HCPCS | Performed by: INTERNAL MEDICINE

## 2019-10-09 PROCEDURE — G8417 CALC BMI ABV UP PARAM F/U: HCPCS | Performed by: INTERNAL MEDICINE

## 2019-10-09 PROCEDURE — 1036F TOBACCO NON-USER: CPT | Performed by: INTERNAL MEDICINE

## 2019-10-09 PROCEDURE — G8427 DOCREV CUR MEDS BY ELIG CLIN: HCPCS | Performed by: INTERNAL MEDICINE

## 2019-10-09 PROCEDURE — 3017F COLORECTAL CA SCREEN DOC REV: CPT | Performed by: INTERNAL MEDICINE

## 2019-10-09 PROCEDURE — 3046F HEMOGLOBIN A1C LEVEL >9.0%: CPT | Performed by: INTERNAL MEDICINE

## 2019-10-09 PROCEDURE — 2022F DILAT RTA XM EVC RTNOPTHY: CPT | Performed by: INTERNAL MEDICINE

## 2019-10-09 PROCEDURE — 99214 OFFICE O/P EST MOD 30 MIN: CPT | Performed by: INTERNAL MEDICINE

## 2019-10-09 RX ORDER — BUPROPION HYDROCHLORIDE 150 MG/1
300 TABLET ORAL
COMMUNITY
Start: 2019-09-26

## 2019-10-09 RX ORDER — ISOSORBIDE MONONITRATE 60 MG/1
60 TABLET, EXTENDED RELEASE ORAL
COMMUNITY
Start: 2019-09-25 | End: 2022-03-14

## 2019-10-09 ASSESSMENT — ENCOUNTER SYMPTOMS: VISUAL CHANGE: 0

## 2019-11-18 RX ORDER — LEVOTHYROXINE SODIUM 112 UG/1
TABLET ORAL
Qty: 30 TABLET | Refills: 1 | Status: SHIPPED | OUTPATIENT
Start: 2019-11-18 | End: 2020-01-20

## 2020-01-21 RX ORDER — LEVOTHYROXINE SODIUM 112 UG/1
TABLET ORAL
Qty: 30 TABLET | Refills: 3 | Status: SHIPPED
Start: 2020-01-21 | End: 2020-05-04 | Stop reason: SDUPTHER

## 2020-05-04 ENCOUNTER — VIRTUAL VISIT (OUTPATIENT)
Dept: ENDOCRINOLOGY | Age: 65
End: 2020-05-04
Payer: COMMERCIAL

## 2020-05-04 PROCEDURE — 99214 OFFICE O/P EST MOD 30 MIN: CPT | Performed by: INTERNAL MEDICINE

## 2020-05-04 PROCEDURE — 2022F DILAT RTA XM EVC RTNOPTHY: CPT | Performed by: INTERNAL MEDICINE

## 2020-05-04 PROCEDURE — G8427 DOCREV CUR MEDS BY ELIG CLIN: HCPCS | Performed by: INTERNAL MEDICINE

## 2020-05-04 PROCEDURE — 3046F HEMOGLOBIN A1C LEVEL >9.0%: CPT | Performed by: INTERNAL MEDICINE

## 2020-05-04 PROCEDURE — 3017F COLORECTAL CA SCREEN DOC REV: CPT | Performed by: INTERNAL MEDICINE

## 2020-05-04 RX ORDER — PEN NEEDLE, DIABETIC 30 GX3/16"
NEEDLE, DISPOSABLE MISCELLANEOUS
Qty: 150 EACH | Refills: 3 | Status: SHIPPED | OUTPATIENT
Start: 2020-05-04 | End: 2021-08-02 | Stop reason: SDUPTHER

## 2020-05-04 ASSESSMENT — ENCOUNTER SYMPTOMS: VISUAL CHANGE: 0

## 2020-05-04 NOTE — PROGRESS NOTES
negatives for diabetes include no foot ulcerations, no polydipsia, no polyphagia, no polyuria, no visual change, no weakness and no weight loss. There are no hypoglycemic complications. Symptoms are worsening. Diabetic complications include heart disease, peripheral neuropathy and retinopathy. Risk factors for coronary artery disease include diabetes mellitus, dyslipidemia, post-menopausal and hypertension. Current diabetic treatment includes intensive insulin program. She is compliant with treatment most of the time. Her weight is stable. She is following a generally healthy diet. Meal planning includes avoidance of concentrated sweets. She has had a previous visit with a dietitian. She rarely participates in exercise. Her home blood glucose trend is increasing steadily. Her breakfast blood glucose is taken between 7-8 am. Her breakfast blood glucose range is generally  mg/dl. Her lunch blood glucose is taken between 12-1 pm. Her lunch blood glucose range is generally >200 mg/dl. Her dinner blood glucose is taken between 7-8 pm. Her dinner blood glucose range is generally >200 mg/dl. An ACE inhibitor/angiotensin II receptor blocker is being taken. She does not see a podiatrist.Eye exam is current. ---she had stress test done in oct 2019 with no intervention recommended but she was prescribed medication for angina she now follows with Dr. Francisco Bae in cardiology  ---fastings are 120 --130   Before dinner are higher   She has been struggling with pain   Still goes to work and is stressed about corona virus     Weight trend: stable  Prior visit with dietician: no  Current diet: on average, 3 meals per day  Current exercise: rare    Has there been any hospitalization, surgery or major illness since the last visit? yes  Has there been any new school, family or social problems since the last visit?   No  Has there been any new family history of members with diabetes, heart disease, stroke, or endocrine related problems since the last visit?   No    Past Medical History:   Diagnosis Date    Anemia     Anxiety     Arthritis     Asthma     Chronic [facility] disease, stage III ([facility])     CKD (chronic kidney disease) stage 3, GFR 30-59 ml/min ([facility]) 6/5/2019    Depression     Diabetes mellitus ([facility] [address]     History of angina 2019    Hyperlipidemia     Hypertension     Hypothyroidism     Intercostal pain     Iron deficiency anemia, unspecified     Neuropathy     Obesity     Osteoarthritis     Retinopathy     bilat eyes     Thyroid disease       Patient Active Problem List   Diagnosis    Uncontrolled type 2 diabetes mellitus with complication, with long-term current use of insulin ([facility])    Essential hypertension    Mixed hyperlipidemia    Obesity (BMI 35.0-39.9 without comorbidity)    CKD (chronic kidney disease) stage 3, GFR 30-59 ml/min ([facility])     Past Surgical History:   Procedure Laterality Date    CHOLECYSTECTOMY      EYE SURGERY  2018    retinopathy     HERNIA REPAIR      OTHER SURGICAL HISTORY Right     thoracic surgery on rt side for empyema    OTHER SURGICAL HISTORY  2018    nerve block- back     OTHER SURGICAL HISTORY  03/2019    ablation back bilateral     TONSILLECTOMY       Social History     Socioeconomic History    Marital status:      Spouse name: Not on file    Number of children: Not on file    Years of education: Not on file    Highest education level: Not on file   Occupational History    Not on file   Social Needs    Financial resource strain: Not on file    Food insecurity     Worry: Not on file     Inability: Not on file   [employer] needs     Medical: Not on file     Non-medical: Not on file   Tobacco Use    Smoking status: Never Smoker    Smokeless tobacco: Never Used   Substance and Sexual Activity    Alcohol use: No    Drug use: No    Sexual activity: Not on file   Lifestyle    Physical activity     Days per week: Not on file     Minutes per session: Not on Cholecalciferol (VITAMIN D3 PO) Take 50,000 Units by mouth once a week      ferrous sulfate 324 (65 Fe) MG EC tablet Take 2 tablets daily      diazepam (VALIUM) 2 MG tablet Take 2 mg by mouth as needed. Claudette Main fluticasone-vilanterol (BREO ELLIPTA) 100-25 MCG/INH AEPB inhaler INHALE ONE DOSE BY MOUTH DAILY      atorvastatin (LIPITOR) 20 MG tablet Take 20 mg by mouth      aspirin 81 MG tablet Take 81 mg by mouth daily      albuterol sulfate  (90 Base) MCG/ACT inhaler Inhale 2 puffs into the lungs      albuterol (PROVENTIL) (2.5 MG/3ML) 0.083% nebulizer solution Inhale 2.5 mg into the lungs      cyanocobalamin 2000 MCG tablet Take 2,000 mcg by mouth daily.  DULoxetine (CYMBALTA) 60 MG capsule Take 60 mg by mouth 2 times daily.  fexofenadine (ALLEGRA ALLERGY) 180 MG tablet Take 180 mg by mouth daily Pt takes PRN      furosemide (LASIX) 40 MG tablet Take 40 mg by mouth daily.  Multiple Vitamins-Minerals (MULTIVITAMIN PO) Take  by mouth.  pregabalin (LYRICA) 100 MG capsule Take 100 mg by mouth daily. .       No current facility-administered medications for this visit.       No Known Allergies  Family Status   Relation Name Status    Father  (Not Specified)    Sister  (Not Specified)    MGM  (Not Specified)       Review of Systems  Constitutional  Patient denies any weight loss denies any weight gain,  Eyes   Pt denies any double vision blurred vision or decreased peripheral vision  Head ear nose throat  Denies any trouble swallowing denies any hoarseness  Denies any shortness of breath denies  Cardiovascular  Denies any chest pain denies any palpitations currently  Gastrointestinal  Denies any nausea ,vomiting ,constipation or diarrhea  Hematological/lymphatic  Denies any blood clot denies or any painful lymph nodes  Genitourinary  Denies any frequent urination denies any nighttime urination  Skin   Denies any acne denies any changes in facial body hair denies any non healing wounds 0.5 cm cyst in rt lobe     In the left lobe superiorly in the mid lobe there is a hypoecheoic nodule with smooth margins measuring 0.58 x0.65 x0.41 cm with grade zero floe In octo 2015 it measured 0.49 x0.40 x 0.26 cm there is  a echogenic focus in this nodule, grade 0 flow smooth margins in sept 2014 0.37 x0.31x0.19)  Inferiorly ( in the mid lobe ) close to trachea is the dominant nodule  It has smooth borders with grade 0 flow and measures 0.63 x0.57 x0.37 cm >>oc 2015 >> 0.74x 0.65 x 0.35 cm ( previous in sept 2014 >> 0.57x0.56x0.37 cm ) this has increased slightly in size     IMPRESSION    1. Normal sized thyroid gland.    2. Left lobe subcentimeter dominant  nodule which has stayed stable   3 . All nodules less than 1 cm   Will continue to monitor         Lab Results   Component Value Date    LABA1C 9.4 08/31/2018    LABA1C 9.4 08/31/2018    LABA1C 10.9 04/25/2018         ASSESSMENT/PLAN:    T2DM with triopathy uncontrolled   A1c was 7.8>>8.5>>7.3>>8.6---control worsened Since last visit>>8.5 >>10.9>>9.4>>8.5>>9.4>>8.7>>7.9    -She is on Basaglar 35  Units >>> novolog 6 with sliding scale. Better blood glucose in the last few weeks   Increase short acting insulin with meals as she is having lows   Discussed Diet modification, Pt has been taking insulin as prescribed and denies having any unexplained hypoglycemic reactions. Pt is able to recognize hypoglycemia and correct it, denies any worsening of neuropathy  Hypoglycemia protocol reviewed in detail with patient Patient was advised to carry glucose tablets .  She lives by herself so she was not able to use Glucagon   Test 4 times a day      Health Maintenance Review:  Last Eye Exam:aug 2019 s/p laser   Lipids: Normal ldl 87 in June 2019   Microalbumin/Creatinine Ratio: Pt has CKD and follows with Dr Elly Vazquez exam marked decrease in vibration sensation and monofilament was intact --- may 2018      Reviewed ABCs of diabetes management (respective goals in

## 2020-05-25 RX ORDER — LEVOTHYROXINE SODIUM 112 UG/1
TABLET ORAL
Qty: 30 TABLET | Refills: 2 | Status: SHIPPED | OUTPATIENT
Start: 2020-05-25 | End: 2020-08-21

## 2020-07-07 ENCOUNTER — TELEPHONE (OUTPATIENT)
Dept: ENDOCRINOLOGY | Age: 65
End: 2020-07-07

## 2020-07-07 RX ORDER — INSULIN GLARGINE 100 [IU]/ML
INJECTION, SOLUTION SUBCUTANEOUS
Qty: 5 PEN | Refills: 3 | Status: SHIPPED | OUTPATIENT
Start: 2020-07-07 | End: 2021-02-01

## 2020-07-07 NOTE — TELEPHONE ENCOUNTER
Please advise patient that she can be seen in the office if she would want I would recommend that she definitely get her blood work done

## 2020-07-07 NOTE — TELEPHONE ENCOUNTER
Patient reports that ins is no longer covering [address] and would like her to switch to Lantus. She is taking 36 units at night and uses the [facility] on [address].      Would like to know what Dr Janet Garcia thoughts are on going to the lab to get bloodwork done, and having in office visits vs virtual.

## 2020-08-13 ENCOUNTER — OFFICE VISIT (OUTPATIENT)
Dept: ENDOCRINOLOGY | Age: 65
End: 2020-08-13
Payer: COMMERCIAL

## 2020-08-13 PROCEDURE — 2022F DILAT RTA XM EVC RTNOPTHY: CPT | Performed by: INTERNAL MEDICINE

## 2020-08-13 PROCEDURE — G8400 PT W/DXA NO RESULTS DOC: HCPCS | Performed by: INTERNAL MEDICINE

## 2020-08-13 PROCEDURE — 1123F ACP DISCUSS/DSCN MKR DOCD: CPT | Performed by: INTERNAL MEDICINE

## 2020-08-13 PROCEDURE — 4040F PNEUMOC VAC/ADMIN/RCVD: CPT | Performed by: INTERNAL MEDICINE

## 2020-08-13 PROCEDURE — G8427 DOCREV CUR MEDS BY ELIG CLIN: HCPCS | Performed by: INTERNAL MEDICINE

## 2020-08-13 PROCEDURE — 1090F PRES/ABSN URINE INCON ASSESS: CPT | Performed by: INTERNAL MEDICINE

## 2020-08-13 PROCEDURE — 3017F COLORECTAL CA SCREEN DOC REV: CPT | Performed by: INTERNAL MEDICINE

## 2020-08-13 PROCEDURE — 99214 OFFICE O/P EST MOD 30 MIN: CPT | Performed by: INTERNAL MEDICINE

## 2020-08-13 PROCEDURE — 3046F HEMOGLOBIN A1C LEVEL >9.0%: CPT | Performed by: INTERNAL MEDICINE

## 2020-08-13 ASSESSMENT — ENCOUNTER SYMPTOMS: VISUAL CHANGE: 0

## 2020-08-13 NOTE — PROGRESS NOTES
Patient  is a 72 y.o. female seen for T2 DM diagnosed in 3734 she has complication retinopathy , neuropathy  her initial presentation to me was confusion and mental status changes due to metabolic hyperglycemic changes    Bob Morris has tried oral hypoglycemic agents in the past including  metformin ,Glipizide and Januvia ,she was on a Insulin 70/30 combination and ended up having frequent  hypoglycemic reactions --- her confusion on initial presentation  resolved after initiation of insulin basal bolus form in January 2010     Pt also has hypothyroidism & is on Lt4 daily --she has no family hx of thyroid disease or thyroid cancer she denies any radiation exposure. Bob Morris also has hypertension and CAD and follows with Cardiology     She had lung infection in sept 2017 and she was on steroids and that messed up her control too but she admits she has not been watching her diet either   Current diabetic medications include: basal bolus insulin regimen     INTERIM:    Diabetes   She presents for her follow-up diabetic visit. She has type 2 diabetes mellitus. No MedicAlert identification noted. The initial diagnosis of diabetes was made 25 years ago. Her disease course has been worsening. Hypoglycemia symptoms include nervousness/anxiousness, sweats and tremors. Pertinent negatives for diabetes include no foot ulcerations, no polydipsia, no polyphagia, no polyuria, no visual change, no weakness and no weight loss. There are no hypoglycemic complications. Symptoms are worsening. Diabetic complications include heart disease, peripheral neuropathy and retinopathy. Risk factors for coronary artery disease include diabetes mellitus, dyslipidemia, post-menopausal and hypertension. Current diabetic treatment includes intensive insulin program. She is compliant with treatment most of the time. Her weight is stable. She is following a generally healthy diet. Meal planning includes avoidance of concentrated sweets.  She has had a previous visit with a dietitian. She rarely participates in exercise. Her home blood glucose trend is increasing steadily. Her breakfast blood glucose is taken between 7-8 am. Her breakfast blood glucose range is generally  mg/dl. Her lunch blood glucose is taken between 12-1 pm. Her lunch blood glucose range is generally >200 mg/dl. Her dinner blood glucose is taken between 7-8 pm. Her dinner blood glucose range is generally >200 mg/dl. An ACE inhibitor/angiotensin II receptor blocker is being taken. She does not see a podiatrist.Eye exam is current. ---she had stress test done in oct 2019 with no intervention recommended but she was prescribed medication for angina she now follows with Dr. Brigitte Story in cardiology  ---fastings are 100--120   She has been using noom program was able to lose 20 pounds and is now mostly eating veggies with them healthy and is not requiring short-acting insulin    Weight trend: stable  Prior  she has beenvisit with dietician: no  Current diet: on average, 3 meals per day  Current exercise: rare    Has there been any hospitalization, surgery or major illness since the last visit? yes  Has there been any new school, family or social problems since the last visit? No  Has there been any new family history of members with diabetes, heart disease, stroke, or endocrine related problems since the last visit?   No    Past Medical History:   Diagnosis Date    Anemia     Anxiety     Arthritis     Asthma     [facility] stage III ([facility])     CKD (chronic kidney disease) stage 3, GFR 30-59 ml/min ([facility]) 6/5/2019    Depression     Diabetes mellitus ([facility] [address]     History of angina 2019    Hyperlipidemia     Hypertension     Hypothyroidism     Intercostal pain     Iron deficiency anemia, unspecified     Neuropathy     Obesity     Osteoarthritis     Retinopathy     bilat eyes     Thyroid disease       Patient Active Problem List   Diagnosis    Uncontrolled type 2 diabetes mellitus with complication, with long-term current use of insulin ([facility]    Essential hypertension    Mixed hyperlipidemia    Obesity (BMI 35.0-39.9 without comorbidity)    CKD (chronic kidney disease) stage 3, GFR 30-59 ml/min ([facility])     Past Surgical History:   Procedure Laterality Date    CHOLECYSTECTOMY      EYE SURGERY  2018    retinopathy     HERNIA REPAIR      OTHER SURGICAL HISTORY Right     thoracic surgery on rt side for empyema    OTHER SURGICAL HISTORY  2018    nerve block- back     OTHER SURGICAL HISTORY  03/2019    ablation back bilateral     TONSILLECTOMY       Social History     Socioeconomic History    Marital status:      Spouse name: Not on file    Number of children: Not on file    Years of education: Not on file    Highest education level: Not on file   Occupational History    Not on file   Social Needs    Financial resource strain: Not on file    Food insecurity     Worry: Not on file     Inability: Not on file    Transportation needs     Medical: Not on file     Non-medical: Not on file   Tobacco Use    Smoking status: Never Smoker    Smokeless tobacco: Never Used   Substance and Sexual Activity    Alcohol use: No    Drug use: No    Sexual activity: Not on file   Lifestyle    Physical activity     Days per week: Not on file     Minutes per session: Not on file    Stress: Not on file   Relationships    Social connections     Talks on phone: Not on file     Gets together: Not on file     Attends Islam service: Not on file     Active member of club or organization: Not on file     Attends meetings of clubs or organizations: Not on file     Relationship status: Not on file    Intimate partner violence     Fear of current or ex partner: Not on file     Emotionally abused: Not on file     Physically abused: Not on file     Forced sexual activity: Not on file   Other Topics Concern    Not on file   Social History Narrative    Not on file     Family normal without visible mass,   Head and Face: visual inspection  of head and face revealed no abnormalities  Eyes: visual inspection showed no lid or conjunctival swelling, erythema or discharge, pupils are normal, equal, round  Ears/Nose: external inspection of ears and nose revealed no abnormalities, hearing is grossly normal  Oropharynx/Mouth/Face: lips, tongue and gums appear  normal with no lesions, the voice quality was normal  Neck: neck appears symmetric, with no visible masses,   Pulmonary: no increased work of breathing or signs of respiratory distress,  Musculoskeletal: normal on inspection    Neurological: normal coordination and normal general cortical function        Lab Results   Component Value Date    LABA1C 9.4 08/31/2018    LABA1C 9.4 08/31/2018    LABA1C 10.9 04/25/2018         ASSESSMENT/PLAN:    T2DM with triopathy uncontrolled   A1c was 7.8>>8.5>>7.3>>8.6---control worsened Since last visit>>8.5 >>10.9>>9.4>>8.5>>9.4>>8.7>>7.9  aic improved to 7.6--labs from [facility] reviewed in detail  She has been working on her diet and has lost 20 lbs and is eating healthy   -She is on Basaglar 34   Units >>> fastings are low 100   She is avoiding high carb meals and is mostly doing doing very low carb and is not using   novolog with meals   She is doing NOOM and has lost 20 lbs   Discussed Diet modification, Pt has been taking insulin as prescribed and denies having any unexplained hypoglycemic reactions. Pt is able to recognize hypoglycemia and correct it, denies any worsening of neuropathy  Hypoglycemia protocol reviewed in detail with patient Patient was advised to carry glucose tablets .  She lives by herself so she was not able to use Glucagon   Test 4 times a day      Health Maintenance Review:  Last Eye Exam:aug 2019 s/p laser   Lipids: Normal ldl 72 in aug 2020   Microalbumin/Creatinine Ratio: Pt has CKD and follows with Dr Tom Larsen exam marked decrease in vibration sensation and monofilament was intact --- may 2018      Reviewed ABCs of diabetes management (respective goals in parentheses): A1C (<7), blood pressure (<130/80), and cholesterol (LDL <100). Hypothyroidism   She is on Levothyroxine 112 mcg daily she will reduce to half tab on weekend   TSH 0.8   --- 9/ 2019>>0.11 in August 2020  Meg Beal is currently biochemicaly  and clinically euthyroid on the  current dose of thyroid hormone replacement . Patient  is advised to take her thyroid hormone supplement on empty stomach without any concomitant Iron or calcium supplement .         VIt D level 32 >>28>>35>>65 in July 2020  She has been on 50,0000 Weekly dosing   Denies any kidney stones   She is on replacement   Most recent level in June 2019 is 28    THYROID NODULE LEFT LOBE 6 mm solid hypoechoeic nodule   Follow-up ultrasound in June 2019  Will advise patient to get a thyroid ultrasound in radiology    OSTEOPENIA dex scan was in 5/2016 repeat in 2018    she was given orders to have repeat DEXA scan done now     CKD   Follows with Dr Phil Heller   Her creatinine is 1.8> 1.4 >>1.99>>1.6 in oct 2019       Hyperlipidemia on meds follows with PCP on statins   She is on lipitor only ---ldl 86 >>100>>85 in June 2019 She was on fenofibrate in the past which I stopped in march 2016 due to elevated CPK   CK was 65 in sep 2016     HYPERTENSION on meds controlled   Continue with current regimen     --- CHF   She is on Lasix   Follows with cardilogy she follows with   She has pulmonary hypertension     Asthma stable   ---Follows with Dr. Yuniel Mcdonnell current regimen       Reviewed and/or ordered clinical lab results yes   Reviewed and/or ordered radiology tests Yes  Reviewed and/or ordered other diagnostic tests yes   Made a decision to obtain old records yes   Reviewed and summarized old records yes     Meg Beal was counseled regarding symptoms of current diagnosis, course and complications of disease if inadequately treated, side effects of medications, diagnosis, treatment options, and prognosis, risks, benefits, complications, and alternatives of treatment, labs, imaging and other studies and treatment targets and goals. She understands instructions and counseling    These diagnosis were discussed and reviewed with the patient including the advantages of drug therapy. She was counseled at this visit on the following: diabetes complication prevention and foot care. TELEHEALTH EVALUATION -- Audio/Visual (During [id number] public health emergency)  Pursuant to the emergency declaration under the [facility], [facility] waiver authority and the [employer] Act, this Virtual  Visit was conducted, with patient's consent, to reduce the patient's risk of exposure to COVID-19 and provide care for  patient. Services were provided through a video synchronous discussion virtually to substitute for in-person clinic visit. Patient's location : home     Patient provided verbal consent to use the video visit. Total time spent : 26 min Reviewing the chart, conducting an interview, performing a limited exam by video and educating the patient on my assessment plan. Return in about 3 months (around 11/13/2020).

## 2020-12-30 ENCOUNTER — TELEPHONE (OUTPATIENT)
Dept: ENDOCRINOLOGY | Age: 65
End: 2020-12-30

## 2020-12-30 RX ORDER — LEVOTHYROXINE SODIUM 112 UG/1
TABLET ORAL
Qty: 30 TABLET | Refills: 2 | Status: SHIPPED | OUTPATIENT
Start: 2020-12-30 | End: 2021-03-11

## 2021-02-22 ENCOUNTER — VIRTUAL VISIT (OUTPATIENT)
Dept: ENDOCRINOLOGY | Age: 66
End: 2021-02-22
Payer: COMMERCIAL

## 2021-02-22 DIAGNOSIS — E03.8 HYPOTHYROIDISM DUE TO HASHIMOTO'S THYROIDITIS: ICD-10-CM

## 2021-02-22 DIAGNOSIS — E06.3 HYPOTHYROIDISM DUE TO HASHIMOTO'S THYROIDITIS: ICD-10-CM

## 2021-02-22 PROCEDURE — 3017F COLORECTAL CA SCREEN DOC REV: CPT | Performed by: INTERNAL MEDICINE

## 2021-02-22 PROCEDURE — G8400 PT W/DXA NO RESULTS DOC: HCPCS | Performed by: INTERNAL MEDICINE

## 2021-02-22 PROCEDURE — 4040F PNEUMOC VAC/ADMIN/RCVD: CPT | Performed by: INTERNAL MEDICINE

## 2021-02-22 PROCEDURE — G8427 DOCREV CUR MEDS BY ELIG CLIN: HCPCS | Performed by: INTERNAL MEDICINE

## 2021-02-22 PROCEDURE — 1123F ACP DISCUSS/DSCN MKR DOCD: CPT | Performed by: INTERNAL MEDICINE

## 2021-02-22 PROCEDURE — 1090F PRES/ABSN URINE INCON ASSESS: CPT | Performed by: INTERNAL MEDICINE

## 2021-02-22 PROCEDURE — 2022F DILAT RTA XM EVC RTNOPTHY: CPT | Performed by: INTERNAL MEDICINE

## 2021-02-22 PROCEDURE — 3052F HG A1C>EQUAL 8.0%<EQUAL 9.0%: CPT | Performed by: INTERNAL MEDICINE

## 2021-02-22 PROCEDURE — 99214 OFFICE O/P EST MOD 30 MIN: CPT | Performed by: INTERNAL MEDICINE

## 2021-02-22 ASSESSMENT — ENCOUNTER SYMPTOMS: VISUAL CHANGE: 0

## 2021-02-22 NOTE — Clinical Note
Please schedule a follow-up in 3 months with a thyroid ultrasound scheduled on the day of the thyroid /diabetes visit in 3 months

## 2021-02-22 NOTE — PROGRESS NOTES
planning includes avoidance of concentrated sweets. She has had a previous visit with a dietitian. She rarely participates in exercise. Her home blood glucose trend is increasing steadily. Her breakfast blood glucose is taken between 7-8 am. Her breakfast blood glucose range is generally  mg/dl. Her lunch blood glucose is taken between 12-1 pm. Her lunch blood glucose range is generally >200 mg/dl. Her dinner blood glucose is taken between 7-8 pm. Her dinner blood glucose range is generally >200 mg/dl. An ACE inhibitor/angiotensin II receptor blocker is being taken. She does not see a podiatrist.Eye exam is current. She had Covid in Jan 2020   ---fastings are 100--120     She has been using noom program was able to lose 20 pounds and is now mostly eating veggies and is not requiring short-acting insulin    Weight trend: stable  Prior  she has beenvisit with dietician: no  Current diet: on average, 3 meals per day  Current exercise: rare    Has there been any hospitalization, surgery or major illness since the last visit? yes  Has there been any new school, family or social problems since the last visit? No  Has there been any new family history of members with diabetes, heart disease, stroke, or endocrine related problems since the last visit?   No    Past Medical History:   Diagnosis Date    Anemia     Anxiety     Arthritis     Asthma     Chronic renal disease, stage III     CKD (chronic kidney disease) stage 3, GFR 30-59 ml/min 6/5/2019    Depression     Diabetes mellitus ([address]     History of angina 2019    Hyperlipidemia     Hypertension     Hypothyroidism     Intercostal pain     Iron deficiency anemia, unspecified     Neuropathy     Obesity     Osteoarthritis     Retinopathy     bilat eyes     Thyroid disease       Patient Active Problem List   Diagnosis    Uncontrolled type 2 diabetes mellitus with complication, with long-term current use of insulin ([address]    Essential hypertension    Mixed hyperlipidemia    Obesity (BMI 35.0-39.9 without comorbidity)    CKD (chronic kidney disease) stage 3, GFR 30-59 ml/min     Past Surgical History:   Procedure Laterality Date    CHOLECYSTECTOMY      EYE SURGERY  2018    retinopathy     HERNIA REPAIR      OTHER SURGICAL HISTORY Right     thoracic surgery on rt side for empyema    OTHER SURGICAL HISTORY  2018    nerve block- back     OTHER SURGICAL HISTORY  03/2019    ablation back bilateral     TONSILLECTOMY       Social History     Socioeconomic History    Marital status:      Spouse name: Not on file    Number of children: Not on file    Years of education: Not on file    Highest education level: Not on file   Occupational History    Not on file   Social Needs    Financial resource strain: Not on file    Food insecurity     Worry: Not on file     Inability: Not on file    Transportation needs     Medical: Not on file     Non-medical: Not on file   Tobacco Use    Smoking status: Never Smoker    Smokeless tobacco: Never Used   Substance and Sexual Activity    Alcohol use: No    Drug use: No    Sexual activity: Not on file   Lifestyle    Physical activity     Days per week: Not on file     Minutes per session: Not on file    Stress: Not on file   Relationships    Social connections     Talks on phone: Not on file     Gets together: Not on file     Attends [employer] service: Not on file     Active member of club or organization: Not on file     Attends meetings of clubs or organizations: Not on file     Relationship status: Not on file    Intimate partner violence     Fear of current or ex partner: Not on file     Emotionally abused: Not on file     Physically abused: Not on file     Forced sexual activity: Not on file   Other Topics Concern    Not on file   Social History Narrative    Not on file     Family History   Problem Relation Age of Onset    Diabetes Father     High Blood Pressure Father     Breast Cancer Sister     High Blood Pressure Sister     Diabetes Maternal Grandmother      Current Outpatient Medications   Medication Sig Dispense Refill    insulin glargine (LANTUS SOLOSTAR) 100 UNIT/ML injection pen INJECT 36 UNITS UNDER THE SKIN ONCE NIGHTLY 15 pen 1    levothyroxine (SYNTHROID) 112 MCG tablet TAKE ONE TABLET BY MOUTH EVERY MORNING BEFORE BREAKFAST 30 tablet 2    Insulin Pen Needle (PEN NEEDLES) 31G X 5 MM MISC Use as directed 4 times daily 150 each 3    isosorbide mononitrate (IMDUR) 60 MG extended release tablet Take 60 mg by mouth Take 60 mg by mouth      buPROPion (WELLBUTRIN XL) 150 MG extended release tablet 1 tab daily      insulin lispro (HUMALOG) 100 UNIT/ML pen INJECT TEN UNITS UNDER THE SKIN THREE TIMES A DAY (Patient taking differently: Pt taking 3-6 units three times a day) 15 pen 3    blood glucose test strips (FREESTYLE LITE) strip USE TO TEST BLOOD SUGAR FOUR TIMES A  strip 2    verapamil (CALAN SR) 180 MG extended release tablet Take 180 mg by mouth 2 times daily      omeprazole (PRILOSEC) 20 MG delayed release capsule Take 20 mg by mouth daily      Cholecalciferol (VITAMIN D3 PO) Take 50,000 Units by mouth once a week Every other week      ferrous sulfate 324 (65 Fe) MG EC tablet Take 2 tablets daily      diazepam (VALIUM) 2 MG tablet Take 2 mg by mouth as needed. Justyn Ascencio fluticasone-vilanterol (BREO ELLIPTA) 100-25 MCG/INH AEPB inhaler INHALE ONE DOSE BY MOUTH DAILY      atorvastatin (LIPITOR) 20 MG tablet Take 20 mg by mouth      aspirin 81 MG tablet Take 81 mg by mouth daily      albuterol sulfate  (90 Base) MCG/ACT inhaler Inhale 2 puffs into the lungs      albuterol (PROVENTIL) (2.5 MG/3ML) 0.083% nebulizer solution Inhale 2.5 mg into the lungs      cyanocobalamin 2000 MCG tablet Take 2,000 mcg by mouth daily.  DULoxetine (CYMBALTA) 60 MG capsule Take 60 mg by mouth 2 times daily.       furosemide (LASIX) 40 MG tablet Take 40 mg by mouth daily.      Multiple Vitamins-Minerals (MULTIVITAMIN PO) Take  by mouth.  pregabalin (LYRICA) 100 MG capsule Take 100 mg by mouth daily. Pt takes 75 mg twice daily      fexofenadine (ALLEGRA ALLERGY) 180 MG tablet Take 180 mg by mouth daily Pt takes PRN       No current facility-administered medications for this visit. No Known Allergies  Family Status   Relation Name Status    Father  (Not Specified)    Sister  (Not Specified)    MGM  (Not Specified)       OBJECTIVE:  There were no vitals taken for this visit.    Wt Readings from Last 3 Encounters:   10/09/19 237 lb 12.8 oz (107.9 kg)   06/05/19 244 lb (110.7 kg)   06/05/19 244 lb 12.8 oz (111 kg)         Constitutional: no acute distress, well appearing and well nourished  Psychiatric: oriented to person, place and time, judgement and insight and normal, recent and remote memory intact and mood and affect are normal  Skin: skin and subcutaneous tissue is normal without visible mass,   Head and Face: visual inspection  of head and face revealed no abnormalities  Eyes: visual inspection showed no lid or conjunctival swelling, erythema or discharge, pupils are normal, equal, round  Ears/Nose: external inspection of ears and nose revealed no abnormalities, hearing is grossly normal  Oropharynx/Mouth/Face: lips, tongue and gums appear  normal with no lesions, the voice quality was normal  Neck: neck appears symmetric, with no visible masses,   Pulmonary: no increased work of breathing or signs of respiratory distress,  Musculoskeletal: normal on inspection    Neurological: normal coordination and normal general cortical function        Lab Results   Component Value Date    LABA1C 8.4 02/19/2021    LABA1C 9.4 08/31/2018    LABA1C 9.4 08/31/2018         ASSESSMENT/PLAN:    T2DM with triopathy uncontrolled   A1c was 7.8>>8.5>>7.3>>8.6---control worsened Since last visit>>8.5 >>10.9>>9.4>>8.5>>9.4>>8.7>>7.9   A1c has improved since last visit    -She is on Services were provided through a video synchronous discussion virtually to substitute for in-person clinic visit. Patient's location : home     Patient provided verbal consent to use the video visit. Total time spent : 30+ min Reviewing the chart, conducting an interview, performing a limited exam by video and educating the patient on my assessment plan. Return in about 3 months (around 5/22/2021).

## 2021-08-02 ENCOUNTER — OFFICE VISIT (OUTPATIENT)
Dept: ENDOCRINOLOGY | Age: 66
End: 2021-08-02
Payer: COMMERCIAL

## 2021-08-02 ENCOUNTER — PROCEDURE VISIT (OUTPATIENT)
Dept: ENDOCRINOLOGY | Age: 66
End: 2021-08-02
Payer: COMMERCIAL

## 2021-08-02 VITALS
HEIGHT: 61 IN | SYSTOLIC BLOOD PRESSURE: 140 MMHG | RESPIRATION RATE: 16 BRPM | HEART RATE: 68 BPM | DIASTOLIC BLOOD PRESSURE: 70 MMHG | WEIGHT: 205 LBS | BODY MASS INDEX: 38.71 KG/M2

## 2021-08-02 DIAGNOSIS — E78.2 MIXED HYPERLIPIDEMIA: ICD-10-CM

## 2021-08-02 DIAGNOSIS — I10 ESSENTIAL HYPERTENSION: ICD-10-CM

## 2021-08-02 DIAGNOSIS — E04.1 THYROID NODULE: Primary | ICD-10-CM

## 2021-08-02 DIAGNOSIS — N18.30 STAGE 3 CHRONIC KIDNEY DISEASE, UNSPECIFIED WHETHER STAGE 3A OR 3B CKD (HCC): ICD-10-CM

## 2021-08-02 PROCEDURE — 99214 OFFICE O/P EST MOD 30 MIN: CPT | Performed by: INTERNAL MEDICINE

## 2021-08-02 PROCEDURE — G8400 PT W/DXA NO RESULTS DOC: HCPCS | Performed by: INTERNAL MEDICINE

## 2021-08-02 PROCEDURE — G8427 DOCREV CUR MEDS BY ELIG CLIN: HCPCS | Performed by: INTERNAL MEDICINE

## 2021-08-02 PROCEDURE — 1123F ACP DISCUSS/DSCN MKR DOCD: CPT | Performed by: INTERNAL MEDICINE

## 2021-08-02 PROCEDURE — G8417 CALC BMI ABV UP PARAM F/U: HCPCS | Performed by: INTERNAL MEDICINE

## 2021-08-02 PROCEDURE — 4040F PNEUMOC VAC/ADMIN/RCVD: CPT | Performed by: INTERNAL MEDICINE

## 2021-08-02 PROCEDURE — 3052F HG A1C>EQUAL 8.0%<EQUAL 9.0%: CPT | Performed by: INTERNAL MEDICINE

## 2021-08-02 PROCEDURE — 1036F TOBACCO NON-USER: CPT | Performed by: INTERNAL MEDICINE

## 2021-08-02 PROCEDURE — 1090F PRES/ABSN URINE INCON ASSESS: CPT | Performed by: INTERNAL MEDICINE

## 2021-08-02 PROCEDURE — 2022F DILAT RTA XM EVC RTNOPTHY: CPT | Performed by: INTERNAL MEDICINE

## 2021-08-02 PROCEDURE — 3017F COLORECTAL CA SCREEN DOC REV: CPT | Performed by: INTERNAL MEDICINE

## 2021-08-02 RX ORDER — PEN NEEDLE, DIABETIC 30 GX3/16"
NEEDLE, DISPOSABLE MISCELLANEOUS
Qty: 150 EACH | Refills: 3 | Status: SHIPPED | OUTPATIENT
Start: 2021-08-02 | End: 2022-07-18 | Stop reason: SDUPTHER

## 2021-08-02 RX ORDER — INSULIN LISPRO 100 [IU]/ML
INJECTION, SOLUTION INTRAVENOUS; SUBCUTANEOUS
Qty: 15 PEN | Refills: 1 | Status: SHIPPED | OUTPATIENT
Start: 2021-08-02 | End: 2021-12-16

## 2021-08-02 RX ORDER — LEVOTHYROXINE SODIUM 0.1 MG/1
100 TABLET ORAL DAILY
Qty: 90 TABLET | Refills: 1 | Status: SHIPPED | OUTPATIENT
Start: 2021-08-02 | End: 2021-11-09

## 2021-08-02 RX ORDER — INSULIN GLARGINE 100 [IU]/ML
INJECTION, SOLUTION SUBCUTANEOUS
Qty: 15 PEN | Refills: 1 | Status: SHIPPED | OUTPATIENT
Start: 2021-08-02 | End: 2022-07-11

## 2021-08-02 ASSESSMENT — ENCOUNTER SYMPTOMS: VISUAL CHANGE: 0

## 2021-08-02 NOTE — PROGRESS NOTES
Thyroid Ultrasound Examination    Technique: A sonogram of the thyroid gland was performed assessing gray-scale appearance and color doppler flow.      Comparison:   June 2019    Findings     Thyroid is noted to be homogenous in texture .     Measurements are in the following order longitudinal x Anteroposterior x transverse  in cm     The Right lobe is normal in size measuring [3.76 ] x 1.49 ] x [ 1.36] cm.      The Left  lobe is normal in size measuring [3.24 ] x [0.81 ] x [ 1.44] cm.         In the Right thyroid lobe there are 2 cysts noted which are both approximately 0.3 centimeter which have been stable in size     ---in  Isthmus/ left lobe  there is a nodule  Is solid ,  oval shape, measuring 0.7x 0.36x 0.69cm . Margins are regular , there is no halo, possible echogenic foci, and grade 1 flow/Doppler. >>previous measurement in June 2019   0.63x 0.39x 0.65.no microcalcifications identified it has grade 0 low on color Doppler. No suspicious features---previous measurements for this nodule ---0.58 x0.65 x0.41 cm with grade zero floe In octo 2015 it measured 0.49 x0.40 x 0.26 cm there is  a echogenic focus in this nodule, grade 0 flow smooth margins in sept 2014 0.37 x0.31x0.19)\"      There is another solid nodule in the middle of the left lobe which measures 0.46 x 0.15 x 0.42 cm.     Impression      --- Normal sized thyroid gland. --In the left lobe there are 2 solid nodules. Both of them are stable when compared to the previous imaging in June 2019. There are no suspicious features at this time. We will consider imaging again based on clinical findings. There is no family history of thyroid cancer no history of radiation exposure.

## 2021-08-02 NOTE — PROGRESS NOTES
Patient  is a 77 y.o. female seen for T2 DM diagnosed in 2499 she has complication of  retinopathy , neuropathy  her initial presentation to me was confusion and mental status changes due to metabolic hyperglycemic changes    Edwige Viramontes has tried oral hypoglycemic agents in the past including  metformin ,Glipizide and Januvia ,she was on a Insulin 70/30 combination and ended up having frequent  hypoglycemic reactions --- her confusion on initial presentation  resolved after initiation of insulin basal bolus form in January 2010     Pt also has hypothyroidism & is on Lt4 daily --she has no family hx of thyroid disease or thyroid cancer she denies any radiation exposure. JAYSON also has hypertension and CAD and follows with Cardiology     She had lung infection in sept 2017 and she was on steroids and that messed up her control  Current diabetic medications include: basal bolus insulin regimen   She underwent stress testing in 2019 and no major coronary artery disease was detected at the time she follows with cardiology    INTERIM:    Diabetes  She presents for her follow-up diabetic visit. She has type 2 diabetes mellitus. No MedicAlert identification noted. The initial diagnosis of diabetes was made 25 years ago. Her disease course has been worsening. Hypoglycemia symptoms include nervousness/anxiousness, sweats and tremors. Pertinent negatives for diabetes include no foot ulcerations, no polydipsia, no polyphagia, no polyuria, no visual change, no weakness and no weight loss. There are no hypoglycemic complications. Symptoms are worsening. Diabetic complications include heart disease, peripheral neuropathy and retinopathy. Risk factors for coronary artery disease include diabetes mellitus, dyslipidemia, post-menopausal and hypertension. Current diabetic treatment includes intensive insulin program. She is compliant with treatment most of the time. Her weight is stable. She is following a generally healthy diet.  Meal planning includes avoidance of concentrated sweets. She has had a previous visit with a dietitian. She rarely participates in exercise. Her home blood glucose trend is increasing steadily. Her breakfast blood glucose is taken between 7-8 am. Her breakfast blood glucose range is generally  mg/dl. Her lunch blood glucose is taken between 12-1 pm. Her lunch blood glucose range is generally >200 mg/dl. Her dinner blood glucose is taken between 7-8 pm. Her dinner blood glucose range is generally >200 mg/dl. An ACE inhibitor/angiotensin II receptor blocker is being taken. She does not see a podiatrist.Eye exam is current. She had Covid in Jan 2020  She has been using noom program was able to lose 40  pounds and is now mostly eating veggies and is not requiring short-acting insulin. Weight trend: stable  Prior  she has beenvisit with dietician: no  Current diet: on average, 3 meals per day  Current exercise: rare    Has there been any hospitalization, surgery or major illness since the last visit? yes  Has there been any new school, family or social problems since the last visit? No  Has there been any new family history of members with diabetes, heart disease, stroke, or endocrine related problems since the last visit?   No    Past Medical History:   Diagnosis Date    Anemia     Anxiety     Arthritis     Asthma     [facility], stage III ([facility]     CKD (chronic kidney disease) stage 3, GFR 30-59 ml/min ([facility]) 6/5/2019    Depression     Diabetes mellitus ([address]     History of angina 2019    Hyperlipidemia     Hypertension     Hypothyroidism     Intercostal pain     Iron deficiency anemia, unspecified     Neuropathy     Obesity     Osteoarthritis     Retinopathy     bilat eyes     Thyroid disease       Patient Active Problem List   Diagnosis    Uncontrolled type 2 diabetes mellitus with complication, with long-term current use of insulin ([address]    Essential hypertension    Mixed Relation Age of Onset    Diabetes Father     High Blood Pressure Father     Breast Cancer Sister     High Blood Pressure Sister     Diabetes Maternal Grandmother      Current Outpatient Medications   Medication Sig Dispense Refill    Insulin Pen Needle (PEN NEEDLES) 31G X 5 MM MISC Use as directed 4 times daily 150 each 3    insulin glargine (LANTUS SOLOSTAR) 100 UNIT/ML injection pen INJECT 36 UNITS UNDER THE SKIN ONCE NIGHTLY 15 pen 1    insulin lispro, 1 Unit Dial, (HUMALOG KWIKPEN) 100 UNIT/ML SOPN Inject 10 units into the skin three times daily with meals. 15 pen 1    levothyroxine (SYNTHROID) 100 MCG tablet Take 1 tablet by mouth daily 90 tablet 1    isosorbide mononitrate (IMDUR) 60 MG extended release tablet Take 60 mg by mouth Take 60 mg by mouth      buPROPion (WELLBUTRIN XL) 150 MG extended release tablet 1 tab daily      insulin lispro (HUMALOG) 100 UNIT/ML pen INJECT TEN UNITS UNDER THE SKIN THREE TIMES A DAY (Patient taking differently: Pt taking 3-6 units three times a day) 15 pen 3    blood glucose test strips (FREESTYLE LITE) strip USE TO TEST BLOOD SUGAR FOUR TIMES A  strip 2    verapamil (CALAN SR) 180 MG extended release tablet Take 180 mg by mouth 2 times daily      omeprazole (PRILOSEC) 20 MG delayed release capsule Take 20 mg by mouth daily      Cholecalciferol (VITAMIN D3 PO) Take 50,000 Units by mouth once a week Every other week      ferrous sulfate 324 (65 Fe) MG EC tablet Take 2 tablets daily      diazepam (VALIUM) 2 MG tablet Take 2 mg by mouth as needed. Apoorva Vázquez fluticasone-vilanterol (BREO ELLIPTA) 100-25 MCG/INH AEPB inhaler INHALE ONE DOSE BY MOUTH DAILY      atorvastatin (LIPITOR) 20 MG tablet Take 20 mg by mouth      aspirin 81 MG tablet Take 81 mg by mouth daily      albuterol sulfate  (90 Base) MCG/ACT inhaler Inhale 2 puffs into the lungs      albuterol (PROVENTIL) (2.5 MG/3ML) 0.083% nebulizer solution Inhale 2.5 mg into the lungs      cyanocobalamin 2000 MCG tablet Take 2,000 mcg by mouth daily.  DULoxetine (CYMBALTA) 60 MG capsule Take 60 mg by mouth 2 times daily.  fexofenadine (ALLEGRA ALLERGY) 180 MG tablet Take 180 mg by mouth daily Pt takes PRN      furosemide (LASIX) 40 MG tablet Take 40 mg by mouth daily.  Multiple Vitamins-Minerals (MULTIVITAMIN PO) Take  by mouth. No current facility-administered medications for this visit.      No Known Allergies  Family Status   Relation Name Status    Father  (Not Specified)    Sister  (Not Specified)    MGM  (Not Specified)       OBJECTIVE:  BP (!) 140/70   Pulse 68   Resp 16   Ht 5' 1\" (1.549 m)   Wt 205 lb (93 kg)   BMI 38.73 kg/m²    Wt Readings from Last 3 Encounters:   08/02/21 205 lb (93 kg)   10/09/19 237 lb 12.8 oz (107.9 kg)   06/05/19 244 lb (110.7 kg)       Constitutional: no acute distress, well appearing, well nourished  Psychiatric: oriented to person, place and time, judgement, insight and normal, recent and remote memory and intact and mood, affect are normal  Skin: skin and subcutaneous tissue is normal without mass,   Head and Face: examination of head and face revealed no abnormalities  Eyes: no lid or conjunctival swelling, no erythema or discharge, pupils are normal,   Ears/Nose: external inspection of ears and nose revealed no abnormalities, hearing is grossly normal  Oropharynx/Mouth/Face: lips, tongue and gums are normal with no lesions, the voice quality was normal  Neck: neck is supple and symmetric, with midline trachea and no masses, thyroid is normal    Pulmonary: no increased work of breathing or signs of respiratory distress, lungs are clear to auscultation  Cardiovascular: normal heart rate and rhythm, normal S1 and S2,   Musculoskeletal: normal gait and station,   Neurological: normal coordination, normal general cortical function          Lab Results   Component Value Date    LABA1C 8.1 07/28/2021    LABA1C 8.4 02/19/2021    LABA1C 9.4 08/31/2018    LABA1C 9.4 08/31/2018         ASSESSMENT/PLAN:    T2DM with triopathy uncontrolled   A1c was 7.8>>8.5>>7.3>>8.6---control worsened Since last visit>>8.5 >>10.9>>9.4>>8.5>>9.4>>8.7>>7.9    Control is not at target continues to be above 8  She believes that the stress at work is contributing to higher glucose values, she will be retiring from her current job in 1 week and hopes that her numbers will be better she is planning a trip to [place] with her sister, she will continue with the insulin regimen. I did discuss with her that if she notes postprandial hypoglycemia she can add the short acting insulin again    -She is on Basaglar 34   Units >30 units >> fastings are low 100   She is avoiding high carb meals and is mostly doing doing very low carb and is not using   novolog with meals. She is following NOOM  Protocol and has lost 40 lbs total, she is excited about her weight loss  Discussed Diet modification, Pt has been taking insulin as prescribed and denies having any unexplained hypoglycemic reactions. Pt is able to recognize hypoglycemia and correct it, denies any worsening of neuropathy  Hypoglycemia protocol reviewed in detail with patient Patient was advised to carry glucose tablets . Test 4 times a day      Health Maintenance Review:  Last Eye Exam:aug 2019 s/p laser   Lipids: Normal ldl 77 feb 2021   Microalbumin/Creatinine Ratio: Pt has CKD and follows with Dr Alcira Dunn exam marked decrease in vibration   discussed foot care in detail    Hypothyroidism   Reduce dose to 100 mcg daily   Previously taking 112 mcg 6 days a week ( 96 mcg )   TSH 0.8   --- 9/ 2019>>0.11 in August 2020>>1.25   Mickey Mode is currently biochemicaly  and clinically euthyroid on the  current dose of thyroid hormone replacement . Patient  is advised to take her thyroid hormone supplement on empty stomach without any concomitant Iron or calcium supplement .         VIt D level 32 >>28>>35>>65 in July following: diabetes complication prevention and foot care. Return in about 3 months (around 11/2/2021).

## 2021-08-02 NOTE — LETTER
[address] [place] [address]  Phone: [phone]  Fax: [phone]    Carrie Quigley MD    August 2, 2021     [place] [address]    Patient: Tony Allen   MR Number: <[id number]>   YOB: 1955   Date of Visit: 8/2/2021       Dear Bo Delgado:    Thank you for referring Mary Nolan to me for evaluation/treatment. Below are the relevant portions of my assessment and plan of care. If you have questions, please do not hesitate to call me. I look forward to following Ivan Valles along with you.     Sincerely,        Carrie Quigley MD

## 2021-11-09 DIAGNOSIS — E04.1 THYROID NODULE: Primary | ICD-10-CM

## 2021-11-09 RX ORDER — LEVOTHYROXINE SODIUM 0.1 MG/1
TABLET ORAL
Qty: 90 TABLET | Refills: 1 | Status: SHIPPED | OUTPATIENT
Start: 2021-11-09 | End: 2022-07-18 | Stop reason: SDUPTHER

## 2021-12-15 ENCOUNTER — PATIENT MESSAGE (OUTPATIENT)
Dept: ENDOCRINOLOGY | Age: 66
End: 2021-12-15

## 2021-12-16 RX ORDER — INSULIN LISPRO 100 [IU]/ML
INJECTION, SOLUTION INTRAVENOUS; SUBCUTANEOUS
Qty: 5 PEN | Refills: 3 | Status: SHIPPED | OUTPATIENT
Start: 2021-12-16 | End: 2022-07-18 | Stop reason: SDUPTHER

## 2021-12-16 NOTE — TELEPHONE ENCOUNTER
From: Aviva Roman  To: Dr. Shirley Monroe  Sent: 12/15/2021 5:21 PM EST  Subject: Prescription Question    My insurance said that Medicare is no longer paying for Lispro. Could you please change it back to Humalog flex pen and send a prescription to [facility] for me? I would appreciate it!   Toma Mckinney  [birth year]

## 2022-03-09 DIAGNOSIS — N18.30 STAGE 3 CHRONIC KIDNEY DISEASE, UNSPECIFIED WHETHER STAGE 3A OR 3B CKD (HCC): ICD-10-CM

## 2022-03-09 LAB
A/G RATIO: 2.2 (ref 1.1–2.2)
ALBUMIN SERPL-MCNC: 4.4 G/DL (ref 3.4–5)
ALP BLD-CCNC: 99 U/L (ref 40–129)
ALT SERPL-CCNC: 16 U/L (ref 10–40)
ANION GAP SERPL CALCULATED.3IONS-SCNC: 11 MMOL/L (ref 3–16)
AST SERPL-CCNC: 14 U/L (ref 15–37)
BILIRUB SERPL-MCNC: 0.3 MG/DL (ref 0–1)
BUN BLDV-MCNC: 21 MG/DL (ref 7–20)
CALCIUM SERPL-MCNC: 10 MG/DL (ref 8.3–10.6)
CHLORIDE BLD-SCNC: 101 MMOL/L (ref 99–110)
CHOLESTEROL, TOTAL: 158 MG/DL (ref 0–199)
CO2: 28 MMOL/L (ref 21–32)
CREAT SERPL-MCNC: 1.2 MG/DL (ref 0.6–1.2)
CREATININE URINE: 81.5 MG/DL (ref 28–259)
GFR AFRICAN AMERICAN: 54
GFR NON-AFRICAN AMERICAN: 45
GLUCOSE BLD-MCNC: 84 MG/DL (ref 70–99)
HDLC SERPL-MCNC: 42 MG/DL (ref 40–60)
LDL CHOLESTEROL CALCULATED: 89 MG/DL
MICROALBUMIN UR-MCNC: 6.5 MG/DL
MICROALBUMIN/CREAT UR-RTO: 79.8 MG/G (ref 0–30)
POTASSIUM SERPL-SCNC: 4 MMOL/L (ref 3.5–5.1)
SODIUM BLD-SCNC: 140 MMOL/L (ref 136–145)
TOTAL PROTEIN: 6.4 G/DL (ref 6.4–8.2)
TRIGL SERPL-MCNC: 133 MG/DL (ref 0–150)
TSH SERPL DL<=0.05 MIU/L-ACNC: 1.63 UIU/ML (ref 0.27–4.2)
VLDLC SERPL CALC-MCNC: 27 MG/DL

## 2022-03-10 LAB
ESTIMATED AVERAGE GLUCOSE: 182.9 MG/DL
HBA1C MFR BLD: 8 %

## 2022-03-14 ENCOUNTER — OFFICE VISIT (OUTPATIENT)
Dept: ENDOCRINOLOGY | Age: 67
End: 2022-03-14
Payer: MEDICARE

## 2022-03-14 VITALS
HEIGHT: 61 IN | DIASTOLIC BLOOD PRESSURE: 72 MMHG | HEART RATE: 80 BPM | WEIGHT: 188.2 LBS | SYSTOLIC BLOOD PRESSURE: 107 MMHG | BODY MASS INDEX: 35.53 KG/M2 | RESPIRATION RATE: 16 BRPM

## 2022-03-14 DIAGNOSIS — I10 ESSENTIAL HYPERTENSION: ICD-10-CM

## 2022-03-14 DIAGNOSIS — E66.9 OBESITY (BMI 35.0-39.9 WITHOUT COMORBIDITY): ICD-10-CM

## 2022-03-14 PROCEDURE — 1090F PRES/ABSN URINE INCON ASSESS: CPT | Performed by: INTERNAL MEDICINE

## 2022-03-14 PROCEDURE — G8484 FLU IMMUNIZE NO ADMIN: HCPCS | Performed by: INTERNAL MEDICINE

## 2022-03-14 PROCEDURE — 4040F PNEUMOC VAC/ADMIN/RCVD: CPT | Performed by: INTERNAL MEDICINE

## 2022-03-14 PROCEDURE — 2022F DILAT RTA XM EVC RTNOPTHY: CPT | Performed by: INTERNAL MEDICINE

## 2022-03-14 PROCEDURE — 1123F ACP DISCUSS/DSCN MKR DOCD: CPT | Performed by: INTERNAL MEDICINE

## 2022-03-14 PROCEDURE — G8400 PT W/DXA NO RESULTS DOC: HCPCS | Performed by: INTERNAL MEDICINE

## 2022-03-14 PROCEDURE — G8427 DOCREV CUR MEDS BY ELIG CLIN: HCPCS | Performed by: INTERNAL MEDICINE

## 2022-03-14 PROCEDURE — 3017F COLORECTAL CA SCREEN DOC REV: CPT | Performed by: INTERNAL MEDICINE

## 2022-03-14 PROCEDURE — G8417 CALC BMI ABV UP PARAM F/U: HCPCS | Performed by: INTERNAL MEDICINE

## 2022-03-14 PROCEDURE — 1036F TOBACCO NON-USER: CPT | Performed by: INTERNAL MEDICINE

## 2022-03-14 PROCEDURE — 99214 OFFICE O/P EST MOD 30 MIN: CPT | Performed by: INTERNAL MEDICINE

## 2022-03-14 PROCEDURE — 3052F HG A1C>EQUAL 8.0%<EQUAL 9.0%: CPT | Performed by: INTERNAL MEDICINE

## 2022-03-14 RX ORDER — HYDRALAZINE HYDROCHLORIDE 10 MG/1
10 TABLET, FILM COATED ORAL 3 TIMES DAILY
COMMUNITY
End: 2022-07-18

## 2022-03-14 RX ORDER — CARVEDILOL 6.25 MG/1
6.25 TABLET ORAL 2 TIMES DAILY WITH MEALS
COMMUNITY

## 2022-03-14 RX ORDER — ISOSORBIDE DINITRATE 20 MG/1
20 TABLET ORAL 3 TIMES DAILY
COMMUNITY
End: 2022-07-18

## 2022-03-14 RX ORDER — EMPAGLIFLOZIN 10 MG/1
1 TABLET, FILM COATED ORAL DAILY
Qty: 30 TABLET | Refills: 5 | Status: SHIPPED | OUTPATIENT
Start: 2022-03-14

## 2022-03-14 ASSESSMENT — ENCOUNTER SYMPTOMS: VISUAL CHANGE: 0

## 2022-03-14 NOTE — PATIENT INSTRUCTIONS
GOALS - 1. HBA1C (3MONTH AVG) SHOULD BE LESS THAN 6.5     PLEASE CHECK YOUR SUGARS:   BEFORE BREAKFAST  BEFORE LUNCH  BEFORE DINNER  BEDTIME  AFTER MEALS    2. FINGERSTICKS SHOULD BE   BEFORE MEALS <   AFTER MEALS < 140   BEDTIME >100     3. CARBOHYDRATES  MEALS 40--60 G  SNACKS 15 - 20 G    4.  BLOOD PRESSURE  < 130/80    --- Start lantus 20  units nightly and increase dose by 2 units every 3 days until fasting blood sugar are below 120     ---Start taking short acting insulin (humalog/novolog) according to carbohydrate to insulin ratio 10 gm of carb = 1 unit of short acting insulin,                                        +    Sliding Scale Insulin for short acting insulin   If BS > 120 -150 take 2 additional units of fast actinginsulin   if --180 take 4 units   181-210 take 6 Units  211-240 take 8 Units   241--270 take 10 Units   271- 300 take 12 Units

## 2022-03-14 NOTE — PROGRESS NOTES
Patient  is a 77 y.o. female seen for T2 DM diagnosed in 3058 she has complication of  retinopathy , neuropathy  her initial presentation to me was confusion and mental status changes due to metabolic hyperglycemic changes    Scot Jones has tried oral hypoglycemic agents in the past including  metformin ,Glipizide and Januvia ,she was on a Insulin 70/30 combination and ended up having frequent  hypoglycemic reactions --- her confusion on initial presentation  resolved after initiation of insulin basal bolus form in January 2010     Pt also has hypothyroidism & is on Lt4 daily --she has no family hx of thyroid disease or thyroid cancer she denies any radiation exposure. JAYSON also has hypertension and CAD and follows with Cardiology     She had lung infection in sept 2017 and she was on steroids and that messed up her control  Current diabetic medications include: basal bolus insulin regimen   She underwent stress testing in 2019 and no major coronary artery disease was detected at the time she follows with cardiology    INTERIM:    Diabetes  She presents for her follow-up diabetic visit. She has type 2 diabetes mellitus. No MedicAlert identification noted. The initial diagnosis of diabetes was made 25 years ago. Her disease course has been worsening. Hypoglycemia symptoms include nervousness/anxiousness, sweats and tremors. Pertinent negatives for diabetes include no foot ulcerations, no polydipsia, no polyphagia, no polyuria, no visual change, no weakness and no weight loss. There are no hypoglycemic complications. Symptoms are worsening. Diabetic complications include heart disease, peripheral neuropathy and retinopathy. Risk factors for coronary artery disease include diabetes mellitus, dyslipidemia, post-menopausal and hypertension. Current diabetic treatment includes intensive insulin program. She is compliant with treatment most of the time. Her weight is stable. She is following a generally healthy diet.  Meal planning includes avoidance of concentrated sweets. She has had a previous visit with a dietitian. She rarely participates in exercise. Her home blood glucose trend is increasing steadily. Her breakfast blood glucose is taken between 7-8 am. Her breakfast blood glucose range is generally  mg/dl. Her lunch blood glucose is taken between 12-1 pm. Her lunch blood glucose range is generally >200 mg/dl. Her dinner blood glucose is taken between 7-8 pm. Her dinner blood glucose range is generally >200 mg/dl. An ACE inhibitor/angiotensin II receptor blocker is being taken. She does not see a podiatrist.Eye exam is current. Using noom program she was able to lose 50+ pounds, she continues to work on diet and exercise and follows the same guidelines. She having occasional low glucose values in the morning and has reduced her Humalog with the meals as well. She has changed most of her specialist and is now following up with a new cardiologist, PCP as well as new nephrologist    Weight trend: stable  Prior  she has beenvisit with dietician: no  Current diet: on average, 3 meals per day  Current exercise: rare    Has there been any hospitalization, surgery or major illness since the last visit? yes  Has there been any new school, family or social problems since the last visit? No  Has there been any new family history of members with diabetes, heart disease, stroke, or endocrine related problems since the last visit?   No    Past Medical History:   Diagnosis Date    Anemia     Anxiety     Arthritis     Asthma     Chronic renal disease, stage III ([facility])     CKD (chronic kidney disease) stage 3, GFR 30-59 ml/min ([facility]) 6/5/2019    Depression     Diabetes mellitus ([facility] [address]     History of angina 2019    Hyperlipidemia     Hypertension     Hypothyroidism     Intercostal pain     Iron deficiency anemia, unspecified     Neuropathy     Obesity     Osteoarthritis     Retinopathy     bilat eyes     Thyroid disease       Patient Active Problem List   Diagnosis    Uncontrolled type 2 diabetes mellitus with complication, with long-term current use of insulin ([facility] [address]    Essential hypertension    Mixed hyperlipidemia    Obesity (BMI 35.0-39.9 without comorbidity)    CKD (chronic kidney disease) stage 3, GFR 30-59 ml/min ([facility])     Past Surgical History:   Procedure Laterality Date    CHOLECYSTECTOMY      EYE SURGERY  2018    retinopathy     HERNIA REPAIR      OTHER SURGICAL HISTORY Right     thoracic surgery on rt side for empyema    OTHER SURGICAL HISTORY  2018    nerve block- back     OTHER SURGICAL HISTORY  03/2019    ablation back bilateral     TONSILLECTOMY       Social History     Socioeconomic History    Marital status:      Spouse name: Not on file    Number of children: Not on file    Years of education: Not on file    Highest education level: Not on file   Occupational History    Not on file   Tobacco Use    Smoking status: Never Smoker    Smokeless tobacco: Never Used   Vaping Use    Vaping Use: Never used   Substance and Sexual Activity    Alcohol use: No    Drug use: No    Sexual activity: Not on file   Other Topics Concern    Not on file   Social History Narrative    Not on file     Social Determinants of Health     Financial Resource Strain:     Difficulty of Paying Living Expenses: Not on file   Food Insecurity:     Worried About Running Out of Food in the Last Year: Not on file    Sydni of Food in the Last Year: Not on file   Transportation Needs:     Lack of Transportation (Medical): Not on file    Lack of Transportation (Non-Medical):  Not on file   Physical Activity:     Days of Exercise per Week: Not on file    Minutes of Exercise per Session: Not on file   Stress:     Feeling of Stress : Not on file   Social Connections:     Frequency of Communication with Friends and Family: Not on file    Frequency of Social Gatherings with Friends and Family: Not on file   Mariano Attends Worship Services: Not on file    Active Member of Clubs or Organizations: Not on file    Attends Club or Organization Meetings: Not on file    Marital Status: Not on file   Intimate Partner Violence:     Fear of Current or Ex-Partner: Not on file    Emotionally Abused: Not on file    Physically Abused: Not on file    Sexually Abused: Not on file   Housing Stability:     Unable to Pay for Housing in the Last Year: Not on file    Number of [place] in the Last Year: Not on file    Unstable Housing in the Last Year: Not on file     Family History   Problem Relation Age of Onset    Diabetes Father     High Blood Pressure Father     Breast Cancer Sister     High Blood Pressure Sister     Diabetes Maternal Grandmother      Current Outpatient Medications   Medication Sig Dispense Refill    isosorbide dinitrate (ISORDIL) 20 MG tablet Take 20 mg by mouth 3 times daily      carvedilol (COREG) 6.25 MG tablet Take 6.25 mg by mouth 2 times daily (with meals)      hydrALAZINE (APRESOLINE) 10 MG tablet Take 10 mg by mouth 3 times daily      empagliflozin (JARDIANCE) 10 MG tablet Take 1 tablet by mouth daily 30 tablet 5    HUMALOG KWIKPEN 100 UNIT/ML SOPN Inject 5 units three times daily with meals.  5 pen 3    levothyroxine (SYNTHROID) 100 MCG tablet TAKE 1 TABLET BY MOUTH EVERY DAY 90 tablet 1    Insulin Pen Needle (PEN NEEDLES) 31G X 5 MM MISC Use as directed 4 times daily 150 each 3    insulin glargine (LANTUS SOLOSTAR) 100 UNIT/ML injection pen INJECT 36 UNITS UNDER THE SKIN ONCE NIGHTLY 15 pen 1    buPROPion (WELLBUTRIN XL) 150 MG extended release tablet 300 mg 1 tab daily       blood glucose test strips (FREESTYLE LITE) strip USE TO TEST BLOOD SUGAR FOUR TIMES A  strip 2    omeprazole (PRILOSEC) 20 MG delayed release capsule Take 20 mg by mouth daily      Cholecalciferol (VITAMIN D3 PO) Take 50,000 Units by mouth once a week Every other week      ferrous sulfate 324 (65 Fe) MG EC tablet Take 2 tablets daily      diazepam (VALIUM) 2 MG tablet Take 2 mg by mouth as needed. Lori Roberto fluticasone-vilanterol (BREO ELLIPTA) 100-25 MCG/INH AEPB inhaler INHALE ONE DOSE BY MOUTH DAILY      atorvastatin (LIPITOR) 20 MG tablet Take 20 mg by mouth 40mg daily      aspirin 81 MG tablet Take 81 mg by mouth daily      albuterol sulfate  (90 Base) MCG/ACT inhaler Inhale 2 puffs into the lungs      albuterol (PROVENTIL) (2.5 MG/3ML) 0.083% nebulizer solution Inhale 2.5 mg into the lungs      cyanocobalamin 2000 MCG tablet Take 2,000 mcg by mouth daily.  DULoxetine (CYMBALTA) 60 MG capsule Take 60 mg by mouth 2 times daily.  fexofenadine (ALLEGRA ALLERGY) 180 MG tablet Take 180 mg by mouth daily Pt takes PRN      furosemide (LASIX) 40 MG tablet Take by mouth daily Alternate daily with 80 mg      Multiple Vitamins-Minerals (MULTIVITAMIN PO) Take  by mouth. No current facility-administered medications for this visit.      No Known Allergies  Family Status   Relation Name Status    Father  (Not Specified)    Sister  (Not Specified)    MGM  (Not Specified)       OBJECTIVE:  /72   Pulse 80   Resp 16   Ht 5' 1\" (1.549 m)   Wt 188 lb 3.2 oz (85.4 kg)   BMI 35.56 kg/m²    Wt Readings from Last 3 Encounters:   03/14/22 188 lb 3.2 oz (85.4 kg)   08/02/21 205 lb (93 kg)   10/09/19 237 lb 12.8 oz (107.9 kg)       Constitutional: no acute distress, well appearing, well nourished  Psychiatric: oriented to person, place and time, judgement, insight and normal, recent and remote memory and intact and mood, affect are normal  Skin: skin and subcutaneous tissue is normal without mass,   Head and Face: examination of head and face revealed no abnormalities  Eyes: no lid or conjunctival swelling, no erythema or discharge, pupils are normal,   Ears/Nose: external inspection of ears and nose revealed no abnormalities, hearing is grossly normal  Oropharynx/Mouth/Face: lips, tongue and gums are normal with no lesions, the voice quality was normal  Neck: neck is supple and symmetric, with midline trachea and no masses, thyroid is normal although difficult to palpate due to body habitus  Pulmonary: no increased work of breathing or signs of respiratory distress, lungs are clear to auscultation  Cardiovascular: normal heart rate and rhythm, normal S1 and S2,   Musculoskeletal: normal gait and station,   Neurological: normal coordination, normal general cortical function          Lab Results   Component Value Date    LABA1C 8.0 03/09/2022    LABA1C 8.1 07/28/2021    LABA1C 8.4 02/19/2021         ASSESSMENT/PLAN:    T2DM with triopathy uncontrolled   A1c was 7.8>>8.5>>7.3>>8.6---control worsened Since last visit>>8.5 >>10.9>>9.4>>8.5>>9.4>>8.7>>7.9    Control is not at target continues to be above 8  She is retired and has more time to work on her diet and continues to lose weight she was encouraged to continue working on that  -She is on Basaglar 23   Fasting gllucose 89---100   Reduce to 20 units   humalog 2--3 units with some meals she was advised to increase the use of Humalog with a meal and was provided with a sliding scale insulin along with carb to insulin ratio of 10:1. She is avoiding high carb meals and is mostly doing doing very low carb and is not using   novolog with meals. She is following NOOM  Protocol and has lost 50 +  lbs total, she is excited about her weight loss  Discussed Diet modification, Pt has been taking insulin as prescribed and denies having any unexplained hypoglycemic reactions. Pt is able to recognize hypoglycemia and correct it, denies any worsening of neuropathy  Hypoglycemia protocol reviewed in detail with patient Patient was advised to carry glucose tablets .  Test 4 times a day      Health Maintenance Review:  Last Eye Exam:aug 2019 s/p laser   Lipids: Normal ldl 77 feb 2021   Microalbumin/Creatinine Ratio: Pt has CKD and follows with Dr Lucita Smith now    Feet exam marked decrease in vibration ---discussed foot care in detail    Hypothyroidism   Patient is on 100 mcg daily  of levothyroxine    Lisa Griffiths is currently biochemicaly  and clinically euthyroid on the  current dose of thyroid hormone replacement . Patient  is advised to take her thyroid hormone supplement on empty stomach without any concomitant Iron or calcium supplement . VIt D level 32 >>28>>35>>65 in July 2020  She has been on 50,0000 Weekly dosing   Denies any kidney stones   She is on replacement   Most recent level in June 2019 is 35    THYROID NODULE LEFT LOBE 6 mm solid hypoechoeic nodule   Follow-up ultrasound in august 2021  Patient was again reminded to get a thyroid ultrasound done  She will be scheduled in my clinic in 3 months to get the ultrasound done.   Will advise patient to get a thyroid ultrasound in radiology    OSTEOPENIA dex scan was in 5/2016 repeat in 2018    she was given orders to have repeat DEXA scan done now     CKD   Follows with Dr Anastacio Lew   Her creatinine is 1.8> 1.4 >>1.99>>1.6 in oct 2019       Hyperlipidemia on meds follows with PCP on statins   She is on lipitor only ---ldl 86 >>100>>85 in June 2019 She was on fenofibrate in the past which I stopped in march 2016 due to elevated CPK   CK was 65 in sep 2016     HYPERTENSION on meds controlled   Continue with current regimen, slightly elevated today advised to check at home and let me know if it stays elevated    --- CHF   She is on Lasix   Follows with cardilogy she follows with   She has pulmonary hypertension     Asthma stable   ---Follows with Dr. Dharmesh Banuelos current regimen       Reviewed and/or ordered clinical lab results yes   Reviewed and/or ordered radiology tests Yes  Reviewed and/or ordered other diagnostic tests yes   Made a decision to obtain old records yes   Reviewed and summarized old records yes     Lisa Griffiths was counseled regarding symptoms of current diagnosis, course and complications of disease if inadequately treated, side effects of medications, diagnosis, treatment options, and prognosis, risks, benefits, complications, and alternatives of treatment, labs, imaging and other studies and treatment targets and goals. She understands instructions and counseling    These diagnosis were discussed and reviewed with the patient including the advantages of drug therapy. She was counseled at this visit on the following: diabetes complication prevention and foot care. Return in about 3 months (around 6/14/2022).

## 2022-07-11 RX ORDER — INSULIN GLARGINE 100 [IU]/ML
INJECTION, SOLUTION SUBCUTANEOUS
Qty: 15 PEN | Refills: 1 | Status: SHIPPED | OUTPATIENT
Start: 2022-07-11 | End: 2022-07-18

## 2022-07-11 RX ORDER — INSULIN DEGLUDEC INJECTION 100 U/ML
INJECTION, SOLUTION SUBCUTANEOUS
Qty: 15 PEN | Refills: 1 | Status: SHIPPED | OUTPATIENT
Start: 2022-07-11 | End: 2022-07-18

## 2022-07-11 NOTE — TELEPHONE ENCOUNTER
Tried submitting PA and insurance states\"    [employer] Key: [id number]  Outcome  This plan is not enabled for ePA. For additional information, please contact customer service using the number on the back of the benefit card. The [employer] and they stated basaglar, levemir, and tresiba are covered. Can pt be switched to one of these alternatives? Please advise. Thanks!

## 2022-07-11 NOTE — TELEPHONE ENCOUNTER
[facility] Key: [id number]  Outcome  Drug  Arrie Starch 100UNIT/ML pen-injectors  Drug is covered by current benefit plan.  No further PA activity needed

## 2022-07-11 NOTE — TELEPHONE ENCOUNTER
Fax from [facility] w a note stating  -do we have the wrong insurance then?  Because supplemental and Part D rejecting

## 2022-07-12 DIAGNOSIS — I10 ESSENTIAL HYPERTENSION: ICD-10-CM

## 2022-07-12 DIAGNOSIS — E66.9 OBESITY (BMI 35.0-39.9 WITHOUT COMORBIDITY): ICD-10-CM

## 2022-07-12 LAB
A/G RATIO: 2 (ref 1.1–2.2)
ALBUMIN SERPL-MCNC: 4.1 G/DL (ref 3.4–5)
ALP BLD-CCNC: 110 U/L (ref 40–129)
ALT SERPL-CCNC: 14 U/L (ref 10–40)
ANION GAP SERPL CALCULATED.3IONS-SCNC: 12 MMOL/L (ref 3–16)
AST SERPL-CCNC: 14 U/L (ref 15–37)
BILIRUB SERPL-MCNC: 0.3 MG/DL (ref 0–1)
BUN BLDV-MCNC: 32 MG/DL (ref 7–20)
CALCIUM SERPL-MCNC: 9.7 MG/DL (ref 8.3–10.6)
CHLORIDE BLD-SCNC: 100 MMOL/L (ref 99–110)
CHOLESTEROL, TOTAL: 148 MG/DL (ref 0–199)
CO2: 29 MMOL/L (ref 21–32)
CREAT SERPL-MCNC: 1.5 MG/DL (ref 0.6–1.2)
CREATININE URINE: 81.5 MG/DL (ref 28–259)
GFR AFRICAN AMERICAN: 42
GFR NON-AFRICAN AMERICAN: 35
GLUCOSE BLD-MCNC: 102 MG/DL (ref 70–99)
HDLC SERPL-MCNC: 44 MG/DL (ref 40–60)
LDL CHOLESTEROL CALCULATED: 83 MG/DL
MICROALBUMIN UR-MCNC: 2.3 MG/DL
MICROALBUMIN/CREAT UR-RTO: 28.2 MG/G (ref 0–30)
POTASSIUM SERPL-SCNC: 4.1 MMOL/L (ref 3.5–5.1)
SODIUM BLD-SCNC: 141 MMOL/L (ref 136–145)
TOTAL PROTEIN: 6.2 G/DL (ref 6.4–8.2)
TRIGL SERPL-MCNC: 107 MG/DL (ref 0–150)
TSH SERPL DL<=0.05 MIU/L-ACNC: 0.54 UIU/ML (ref 0.27–4.2)
VLDLC SERPL CALC-MCNC: 21 MG/DL

## 2022-07-13 LAB
ESTIMATED AVERAGE GLUCOSE: 137 MG/DL
HBA1C MFR BLD: 6.4 %

## 2022-07-13 RX ORDER — INSULIN GLARGINE 300 U/ML
INJECTION, SOLUTION SUBCUTANEOUS
Qty: 15 PEN | Refills: 1 | Status: SHIPPED | OUTPATIENT
Start: 2022-07-13

## 2022-07-13 NOTE — TELEPHONE ENCOUNTER
Pt states her [place] will cost $ 100 per month. Wants to know if there is a generic? What about the Basaglar?  Pt states there is one that will only cost her $35 dollars since she's in the donut hole but does know which one that is  [facility] -[address] [place] [address]# [phone]

## 2022-07-18 ENCOUNTER — OFFICE VISIT (OUTPATIENT)
Dept: ENDOCRINOLOGY | Age: 67
End: 2022-07-18
Payer: MEDICARE

## 2022-07-18 VITALS
HEIGHT: 61 IN | BODY MASS INDEX: 35.3 KG/M2 | RESPIRATION RATE: 16 BRPM | DIASTOLIC BLOOD PRESSURE: 58 MMHG | SYSTOLIC BLOOD PRESSURE: 108 MMHG | HEART RATE: 70 BPM | WEIGHT: 187 LBS

## 2022-07-18 DIAGNOSIS — E04.1 THYROID NODULE: ICD-10-CM

## 2022-07-18 DIAGNOSIS — E03.9 ACQUIRED HYPOTHYROIDISM: Primary | ICD-10-CM

## 2022-07-18 PROCEDURE — 95251 CONT GLUC MNTR ANALYSIS I&R: CPT | Performed by: INTERNAL MEDICINE

## 2022-07-18 PROCEDURE — 99214 OFFICE O/P EST MOD 30 MIN: CPT | Performed by: INTERNAL MEDICINE

## 2022-07-18 PROCEDURE — 3044F HG A1C LEVEL LT 7.0%: CPT | Performed by: INTERNAL MEDICINE

## 2022-07-18 PROCEDURE — 1123F ACP DISCUSS/DSCN MKR DOCD: CPT | Performed by: INTERNAL MEDICINE

## 2022-07-18 RX ORDER — ISOSORBIDE MONONITRATE 60 MG/1
TABLET, EXTENDED RELEASE ORAL
COMMUNITY
Start: 2022-07-01

## 2022-07-18 RX ORDER — LEVOTHYROXINE SODIUM 0.1 MG/1
TABLET ORAL
Qty: 90 TABLET | Refills: 1 | Status: SHIPPED | OUTPATIENT
Start: 2022-07-18

## 2022-07-18 RX ORDER — INSULIN LISPRO 100 [IU]/ML
INJECTION, SOLUTION INTRAVENOUS; SUBCUTANEOUS
Qty: 5 PEN | Refills: 3 | Status: SHIPPED | OUTPATIENT
Start: 2022-07-18

## 2022-07-18 RX ORDER — PEN NEEDLE, DIABETIC 30 GX3/16"
NEEDLE, DISPOSABLE MISCELLANEOUS
Qty: 150 EACH | Refills: 3 | Status: SHIPPED | OUTPATIENT
Start: 2022-07-18

## 2022-07-18 ASSESSMENT — ENCOUNTER SYMPTOMS: VISUAL CHANGE: 0

## 2022-07-18 NOTE — PROGRESS NOTES
Patient  is a 79 y.o. female seen for T2 DM diagnosed in 4412 she has complication of  retinopathy , neuropathy  her initial presentation to me was confusion and mental status changes due to metabolic hyperglycemic changes    Robert Noonan has tried oral hypoglycemic agents in the past including  metformin ,Glipizide and Januvia ,she was on a Insulin 70/30 combination and ended up having frequent  hypoglycemic reactions --- her confusion on initial presentation  resolved after initiation of insulin basal bolus form in January 2010     Pt also has hypothyroidism & is on Lt4 daily --she has no family hx of thyroid disease or thyroid cancer she denies any radiation exposure. JAYSON also has hypertension and CAD and follows with Cardiology     She had lung infection in sept 2017 and she was on steroids and that messed up her control  Current diabetic medications include: basal bolus insulin regimen   She underwent stress testing in 2019 and no major coronary artery disease was detected at the time she follows with cardiology    INTERIM:    Diabetes  She presents for her follow-up diabetic visit. She has type 2 diabetes mellitus. No MedicAlert identification noted. The initial diagnosis of diabetes was made 25 years ago. Her disease course has been worsening. Hypoglycemia symptoms include nervousness/anxiousness, sweats and tremors. Pertinent negatives for diabetes include no foot ulcerations, no polydipsia, no polyphagia, no polyuria, no visual change, no weakness and no weight loss. There are no hypoglycemic complications. Symptoms are worsening. Diabetic complications include heart disease, peripheral neuropathy and retinopathy. Risk factors for coronary artery disease include diabetes mellitus, dyslipidemia, post-menopausal and hypertension. Current diabetic treatment includes intensive insulin program. She is compliant with treatment most of the time. Her weight is stable. She is following a generally healthy diet.  Meal planning includes avoidance of concentrated sweets. She has had a previous visit with a dietitian. She rarely participates in exercise. Her home blood glucose trend is increasing steadily. Her breakfast blood glucose is taken between 7-8 am. Her breakfast blood glucose range is generally  mg/dl. Her lunch blood glucose is taken between 12-1 pm. Her lunch blood glucose range is generally >200 mg/dl. Her dinner blood glucose is taken between 7-8 pm. Her dinner blood glucose range is generally >200 mg/dl. An ACE inhibitor/angiotensin II receptor blocker is being taken. She does not see a podiatrist.Eye exam is current. Previously used noom program she was able to lose 50+ pounds, she continues to work on diet and exercise and follows the same guidelines. She having occasional low glucose values in the morning and has reduced her Humalog with the meals as well. Patient has been watching her diet and restricting her carbohydrates. Weight trend: stable  Prior  she has beenvisit with dietician: no  Current diet: on average, 3 meals per day  Current exercise: rare    Has there been any hospitalization, surgery or major illness since the last visit? yes  Has there been any new school, family or social problems since the last visit? No  Has there been any new family history of members with diabetes, heart disease, stroke, or endocrine related problems since the last visit?   No    Past Medical History:   Diagnosis Date    Anemia     Anxiety     Arthritis     Asthma     Chronic renal disease, stage III (HCC)     CKD (chronic kidney disease) stage 3, GFR 30-59 ml/min ([facility] [address] 6/5/2019    Depression     Diabetes mellitus ([facility] [address]     History of angina 2019    Hyperlipidemia     Hypertension     Hypothyroidism     Intercostal pain     Iron deficiency anemia, unspecified     Neuropathy     Obesity     Osteoarthritis     Retinopathy     bilat eyes     Thyroid disease       Patient Active Problem List   Diagnosis Uncontrolled type 2 diabetes mellitus with complication, with long-term current use of insulin ([facility])    Essential hypertension    Mixed hyperlipidemia    Obesity (BMI 35.0-39.9 without comorbidity)    CKD (chronic kidney disease) stage 3, GFR 30-59 ml/min ([facility])     Past Surgical History:   Procedure Laterality Date    CHOLECYSTECTOMY      EYE SURGERY  2018    retinopathy     HERNIA REPAIR      OTHER SURGICAL HISTORY Right     thoracic surgery on rt side for empyema    OTHER SURGICAL HISTORY  2018    nerve block- back     OTHER SURGICAL HISTORY  03/2019    ablation back bilateral     TONSILLECTOMY       Social History     Socioeconomic History    Marital status:      Spouse name: Not on file    Number of children: Not on file    Years of education: Not on file    Highest education level: Not on file   Occupational History    Not on file   Tobacco Use    Smoking status: Never    Smokeless tobacco: Never   Vaping Use    Vaping Use: Never used   Substance and Sexual Activity    Alcohol use: No    Drug use: No    Sexual activity: Not on file   Other Topics Concern    Not on file   Social History Narrative    Not on file     Social Determinants of Health     Financial Resource Strain: Not on file   Food Insecurity: Not on file   Transportation Needs: Not on file   Physical Activity: Not on file   Stress: Not on file   Social Connections: Not on file   Intimate Partner Violence: Not on file   Housing Stability: Not on file     Family History   Problem Relation Age of Onset    Diabetes Father     High Blood Pressure Father     Breast Cancer Sister     High Blood Pressure Sister     Diabetes Maternal Grandmother      Current Outpatient Medications   Medication Sig Dispense Refill    isosorbide mononitrate (IMDUR) 60 MG extended release tablet TAKE 1 TABLET BY MOUTH EVERY MORNING      HUMALOG KWIKPEN 100 UNIT/ML SOPN Inject 5 units three times daily with meals.  5 pen 3    Insulin Pen Needle (PEN NEEDLES) 31G X 5 MM MISC Use as directed 4 times daily 150 each 3    levothyroxine (SYNTHROID) 100 MCG tablet TAKE 1 TABLET BY MOUTH EVERY DAY 90 tablet 1    empagliflozin (JARDIANCE) 10 MG tablet Take 1 tablet by mouth in the morning. 90 tablet 3    empagliflozin (JARDIANCE) 10 MG tablet Take 1 tablet by mouth in the morning. 7 tablet 0    Insulin Glargine, 1 Unit Dial, (TOUJEO SOLOSTAR) 300 UNIT/ML SOPN Inject 36 units into the skin daily. (Patient taking differently: Inject 20 units into the skin daily. ) 15 pen 1    carvedilol (COREG) 6.25 MG tablet Take 6.25 mg by mouth 2 times daily (with meals)      empagliflozin (JARDIANCE) 10 MG tablet Take 1 tablet by mouth daily 30 tablet 5    buPROPion (WELLBUTRIN XL) 150 MG extended release tablet 300 mg 1 tab daily       blood glucose test strips (FREESTYLE LITE) strip USE TO TEST BLOOD SUGAR FOUR TIMES A  strip 2    omeprazole (PRILOSEC) 20 MG delayed release capsule Take 20 mg by mouth daily      Cholecalciferol (VITAMIN D3 PO) Take 50,000 Units by mouth once a week Every other week      ferrous sulfate 324 (65 Fe) MG EC tablet Take 2 tablets daily      diazepam (VALIUM) 2 MG tablet Take 2 mg by mouth as needed. .      fluticasone-vilanterol (BREO ELLIPTA) 100-25 MCG/INH AEPB inhaler INHALE ONE DOSE BY MOUTH DAILY      atorvastatin (LIPITOR) 20 MG tablet Take 20 mg by mouth 20 mg twice daily      aspirin 81 MG tablet Take 81 mg by mouth daily      albuterol sulfate  (90 Base) MCG/ACT inhaler Inhale 2 puffs into the lungs      albuterol (PROVENTIL) (2.5 MG/3ML) 0.083% nebulizer solution Inhale 2.5 mg into the lungs      cyanocobalamin 2000 MCG tablet Take 2,000 mcg by mouth daily. DULoxetine (CYMBALTA) 60 MG extended release capsule Take 60 mg by mouth 2 times daily.       fexofenadine (ALLEGRA) 180 MG tablet Take 180 mg by mouth daily Pt takes PRN      furosemide (LASIX) 40 MG tablet Take by mouth daily Alternate daily with 80 mg      Multiple Vitamins-Minerals (MULTIVITAMIN PO) Take  by mouth. No current facility-administered medications for this visit. No Known Allergies  Family Status   Relation Name Status    Father  (Not Specified)    Sister  (Not Specified)    MGM  (Not Specified)       OBJECTIVE:  BP (!) 108/58   Pulse 70   Resp 16   Ht 5' 1\" (1.549 m)   Wt 187 lb (84.8 kg)   BMI 35.33 kg/m²    Wt Readings from Last 3 Encounters:   07/18/22 187 lb (84.8 kg)   03/14/22 188 lb 3.2 oz (85.4 kg)   08/02/21 205 lb (93 kg)         ROS  I have reviewed the review of system questionnaire filled by the patient .   Patient was advised to contact PCP for non endocrine signs and symptoms     Constitutional: no acute distress, well appearing, well nourished  Psychiatric: oriented to person, place and time, judgement, insight and normal, recent and remote memory and intact and mood, affect are normal  Skin: skin and subcutaneous tissue is normal without mass,   Head and Face: examination of head and face revealed no abnormalities  Eyes: no lid or conjunctival swelling, no erythema or discharge, pupils are normal,   Ears/Nose: external inspection of ears and nose revealed no abnormalities, hearing is grossly normal  Oropharynx/Mouth/Face: lips, tongue and gums are normal with no lesions, the voice quality was normal  Neck: neck is supple and symmetric, with midline trachea and no masses, thyroid is normal although difficult to palpate due to body habitus  Pulmonary: no increased work of breathing or signs of respiratory distress, lungs are clear to auscultation  Cardiovascular: normal heart rate and rhythm, normal S1 and S2,   Musculoskeletal: normal gait and station,   Neurological: normal coordination, normal general cortical function          Lab Results   Component Value Date/Time    LABA1C 6.4 07/12/2022 11:04 AM    LABA1C 8.0 03/09/2022 10:44 AM    LABA1C 8.1 07/28/2021 07:22 AM         ASSESSMENT/PLAN:    T2DM with triopathy uncontrolled   A1c was 7. 8>>8.5>>7.3>>8.6---control worsened Since last visit>>8.5 >>10.9>>9.4>>8.5>>9.4>>8.7>>7.9    Control is at target @ 6.4  She is retired and has more time to work on her diet and continues to lose weight she was encouraged to continue working on that  -She is on Basaglar 18 ---Reduce to 16 units   humalog 2--3 units with some meals she was advised to increase the use of Humalog with a meal and was provided with a sliding scale insulin along with carb to insulin ratio of 10:1. --Will add Jardiance 10 mg daily  She is avoiding high carb meals and is mostly doing doing very low carb and is not using   novolog with meals. She is following NOOM  Protocol and has lost 50 +  lbs total, no further weight loss since March 2022. Continues to have stable weight. Discussed Diet modification, Pt has been taking insulin as prescribed and denies having any unexplained hypoglycemic reactions. Pt is able to recognize hypoglycemia and correct it, denies any worsening of neuropathy  Hypoglycemia protocol reviewed in detail with patient Patient was advised to carry glucose tablets . Test 4 times a day      Health Maintenance Review:  Last Eye Exam:aug 2022 s/p laser   Lipids: Normal ldl 83 in July 2022   Microalbumin/Creatinine Ratio: Pt has CKD and follows with Dr Alfredo Cabral now    Feet exam marked decrease in vibration ---discussed foot care in detail    Hypothyroidism   Patient is on 100 mcg daily  of levothyroxine  TSH 0.54 in July 2022  Sharmin Arreola is currently biochemicaly  and clinically euthyroid on the  current dose of thyroid hormone replacement . Patient  is advised to take her thyroid hormone supplement on empty stomach without any concomitant Iron or calcium supplement .     VIt D level 32 >>28>>35>>65 in July 2020  She has been on 50,0000 Weekly dosing   Denies any kidney stones   She is on replacement   Most recent level in June 2019 is 35    THYROID NODULE LEFT LOBE 6 mm solid hypoechoeic nodule   Follow-up ultrasound in august 2021  Patient was again reminded to get a thyroid ultrasound done  She will be scheduled in my clinic in 3 months to get the ultrasound done. Will advise patient to get a thyroid ultrasound in radiology    OSTEOPENIA dex scan was in 5/2016 repeat in 2018    she was given orders to have repeat DEXA scan done now     CKD   Follows with Dr Denise Fuller   Her creatinine is 1.8> 1.4 >>1.99>>1.6 in oct 2019       Hyperlipidemia on meds follows with PCP on statins   She is on lipitor only ---ldl 86 >>100>>85 in June 2019 She was on fenofibrate in the past which I stopped in march 2016 due to elevated CPK   CK was 65 in sep 2016     HYPERTENSION on meds controlled   Continue with current regimen, slightly elevated today advised to check at home and let me know if it stays elevated    --- CHF   She is on Lasix   Follows with cardilogy she follows with   She has pulmonary hypertension     Asthma stable   ---Follows with Dr. Roxana Aldana current regimen       Reviewed and/or ordered clinical lab results yes   Reviewed and/or ordered radiology tests Yes  Reviewed and/or ordered other diagnostic tests yes   Made a decision to obtain old records yes   Reviewed and summarized old records yes     Oliver Monzon was counseled regarding symptoms of current diagnosis, course and complications of disease if inadequately treated, side effects of medications, diagnosis, treatment options, and prognosis, risks, benefits, complications, and alternatives of treatment, labs, imaging and other studies and treatment targets and goals. She understands instructions and counseling    These diagnosis were discussed and reviewed with the patient including the advantages of drug therapy. She was counseled at this visit on the following: diabetes complication prevention and foot care.      Diabetes Continuous Glucose Monitoring Report         Reason for Study:     - improve diabetic control without risk of hypoglycemia       Current Medication regimen:   Basal bolus insulin regimen     CGMS Report     CGMS data collection was performed on July 18, 2022  Patient provided information on her  diet, activities and insulin dosing  during this period. Data was available for 14 days     Sensor Data Report:   - 12 AM to 6 AM: Overnight blood glucose pattern shows stable glycemia  - 6   AM to 10 AM:  Post breakfast minimal hypoglycemia is noted  --10AM to 5 PM : No hypoglycemia observed during this time. - 5   PM to 8 PM: Post meal hyperglycemia is noted       Average reading 131 mg/dL     Standard Dev 35 mg/dL   % of time <70 mg/dL less than 1%   % of time >180  mg/dL 10%   % of time within range 90%  Number of hypoglycemia episodes noted: 0     Impression:   CGMS shows overall stable glycemia with some postprandial hyperglycemia     Recommendation:      Patient was advised to make the following changes in her diabetic regimen  No significant hypoglycemia appreciated patient was advised to continue counting her carbs correctly and restrict her carbohydrates              Return in about 4 months (around 11/18/2022).

## 2022-07-26 ENCOUNTER — PATIENT MESSAGE (OUTPATIENT)
Dept: ENDOCRINOLOGY | Age: 67
End: 2022-07-26

## 2022-07-26 NOTE — TELEPHONE ENCOUNTER
From: Desi Guzmán  To: Dr. Kelsie Montilla  Sent: 7/26/2022 4:28 PM EDT  Subject: Vitamin D    Could you please put in for a refill of my vitamin D?   Maria Esther Pulido 1955

## 2022-08-03 ENCOUNTER — TELEPHONE (OUTPATIENT)
Dept: ENDOCRINOLOGY | Age: 67
End: 2022-08-03

## 2022-08-18 ENCOUNTER — TELEPHONE (OUTPATIENT)
Dept: ENDOCRINOLOGY | Age: 67
End: 2022-08-18

## 2022-08-22 ENCOUNTER — TELEPHONE (OUTPATIENT)
Dept: ENDOCRINOLOGY | Age: 67
End: 2022-08-22

## 2022-08-25 DIAGNOSIS — E04.1 THYROID NODULE: ICD-10-CM

## 2022-08-25 RX ORDER — LEVOTHYROXINE SODIUM 0.1 MG/1
TABLET ORAL
Qty: 90 TABLET | Refills: 1 | OUTPATIENT
Start: 2022-08-25

## 2022-11-09 ENCOUNTER — HOSPITAL ENCOUNTER (OUTPATIENT)
Dept: ULTRASOUND IMAGING | Age: 67
Discharge: HOME | End: 2022-11-09
Payer: MEDICARE

## 2022-11-09 DIAGNOSIS — E03.9 ACQUIRED HYPOTHYROIDISM: ICD-10-CM

## 2022-11-09 DIAGNOSIS — E04.1 THYROID NODULE: ICD-10-CM

## 2022-11-09 LAB
A/G RATIO: 2.2 (ref 1.1–2.2)
ALBUMIN SERPL-MCNC: 4.3 G/DL (ref 3.4–5)
ALP BLD-CCNC: 111 U/L (ref 40–129)
ALT SERPL-CCNC: 17 U/L (ref 10–40)
ANION GAP SERPL CALCULATED.3IONS-SCNC: 9 MMOL/L (ref 3–16)
AST SERPL-CCNC: 15 U/L (ref 15–37)
BILIRUB SERPL-MCNC: 0.3 MG/DL (ref 0–1)
BUN BLDV-MCNC: 25 MG/DL (ref 7–20)
CALCIUM SERPL-MCNC: 10 MG/DL (ref 8.3–10.6)
CHLORIDE BLD-SCNC: 99 MMOL/L (ref 99–110)
CHOLESTEROL, TOTAL: 158 MG/DL (ref 0–199)
CO2: 32 MMOL/L (ref 21–32)
CREAT SERPL-MCNC: 1.4 MG/DL (ref 0.6–1.2)
GFR SERPL CREATININE-BSD FRML MDRD: 41 ML/MIN/{1.73_M2}
GLUCOSE BLD-MCNC: 87 MG/DL (ref 70–99)
HDLC SERPL-MCNC: 47 MG/DL (ref 40–60)
LDL CHOLESTEROL CALCULATED: 93 MG/DL
POTASSIUM SERPL-SCNC: 3.9 MMOL/L (ref 3.5–5.1)
SODIUM BLD-SCNC: 140 MMOL/L (ref 136–145)
TOTAL PROTEIN: 6.3 G/DL (ref 6.4–8.2)
TRIGL SERPL-MCNC: 90 MG/DL (ref 0–150)
TSH SERPL DL<=0.05 MIU/L-ACNC: 0.88 UIU/ML (ref 0.27–4.2)
VLDLC SERPL CALC-MCNC: 18 MG/DL

## 2022-11-09 PROCEDURE — 76536 US EXAM OF HEAD AND NECK: CPT

## 2022-11-10 LAB
ESTIMATED AVERAGE GLUCOSE: 139.9 MG/DL
HBA1C MFR BLD: 6.5 %

## 2022-11-14 ENCOUNTER — OFFICE VISIT (OUTPATIENT)
Dept: ENDOCRINOLOGY | Age: 67
End: 2022-11-14
Payer: MEDICARE

## 2022-11-14 VITALS
HEART RATE: 66 BPM | HEIGHT: 61 IN | SYSTOLIC BLOOD PRESSURE: 97 MMHG | DIASTOLIC BLOOD PRESSURE: 61 MMHG | RESPIRATION RATE: 16 BRPM | WEIGHT: 177 LBS | BODY MASS INDEX: 33.42 KG/M2

## 2022-11-14 DIAGNOSIS — E03.9 ACQUIRED HYPOTHYROIDISM: ICD-10-CM

## 2022-11-14 DIAGNOSIS — E11.29 CONTROLLED TYPE 2 DIABETES MELLITUS WITH MICROALBUMINURIA, WITH LONG-TERM CURRENT USE OF INSULIN (HCC): Primary | ICD-10-CM

## 2022-11-14 DIAGNOSIS — N18.31 STAGE 3A CHRONIC KIDNEY DISEASE (HCC): ICD-10-CM

## 2022-11-14 DIAGNOSIS — M85.851 OSTEOPENIA OF NECK OF RIGHT FEMUR: ICD-10-CM

## 2022-11-14 DIAGNOSIS — R80.9 CONTROLLED TYPE 2 DIABETES MELLITUS WITH MICROALBUMINURIA, WITH LONG-TERM CURRENT USE OF INSULIN (HCC): Primary | ICD-10-CM

## 2022-11-14 DIAGNOSIS — Z79.4 CONTROLLED TYPE 2 DIABETES MELLITUS WITH MICROALBUMINURIA, WITH LONG-TERM CURRENT USE OF INSULIN (HCC): Primary | ICD-10-CM

## 2022-11-14 DIAGNOSIS — E78.2 MIXED HYPERLIPIDEMIA: ICD-10-CM

## 2022-11-14 PROCEDURE — 2022F DILAT RTA XM EVC RTNOPTHY: CPT | Performed by: INTERNAL MEDICINE

## 2022-11-14 PROCEDURE — 1090F PRES/ABSN URINE INCON ASSESS: CPT | Performed by: INTERNAL MEDICINE

## 2022-11-14 PROCEDURE — 1036F TOBACCO NON-USER: CPT | Performed by: INTERNAL MEDICINE

## 2022-11-14 PROCEDURE — 95251 CONT GLUC MNTR ANALYSIS I&R: CPT | Performed by: INTERNAL MEDICINE

## 2022-11-14 PROCEDURE — 1123F ACP DISCUSS/DSCN MKR DOCD: CPT | Performed by: INTERNAL MEDICINE

## 2022-11-14 PROCEDURE — 3078F DIAST BP <80 MM HG: CPT | Performed by: INTERNAL MEDICINE

## 2022-11-14 PROCEDURE — G8417 CALC BMI ABV UP PARAM F/U: HCPCS | Performed by: INTERNAL MEDICINE

## 2022-11-14 PROCEDURE — 3017F COLORECTAL CA SCREEN DOC REV: CPT | Performed by: INTERNAL MEDICINE

## 2022-11-14 PROCEDURE — G8484 FLU IMMUNIZE NO ADMIN: HCPCS | Performed by: INTERNAL MEDICINE

## 2022-11-14 PROCEDURE — 99214 OFFICE O/P EST MOD 30 MIN: CPT | Performed by: INTERNAL MEDICINE

## 2022-11-14 PROCEDURE — G8400 PT W/DXA NO RESULTS DOC: HCPCS | Performed by: INTERNAL MEDICINE

## 2022-11-14 PROCEDURE — 3044F HG A1C LEVEL LT 7.0%: CPT | Performed by: INTERNAL MEDICINE

## 2022-11-14 PROCEDURE — G8427 DOCREV CUR MEDS BY ELIG CLIN: HCPCS | Performed by: INTERNAL MEDICINE

## 2022-11-14 PROCEDURE — 3074F SYST BP LT 130 MM HG: CPT | Performed by: INTERNAL MEDICINE

## 2022-11-14 RX ORDER — NITROGLYCERIN 0.4 MG/1
TABLET SUBLINGUAL
COMMUNITY
Start: 2022-10-22

## 2022-11-14 RX ORDER — LOSARTAN POTASSIUM 25 MG/1
TABLET ORAL
COMMUNITY
Start: 2022-11-05

## 2022-11-14 RX ORDER — AMLODIPINE BESYLATE 5 MG/1
TABLET ORAL
COMMUNITY
Start: 2022-11-05

## 2022-11-14 ASSESSMENT — ENCOUNTER SYMPTOMS: VISUAL CHANGE: 0

## 2022-11-14 NOTE — PROGRESS NOTES
Patient  is a 79 y.o. female seen for T2 DM diagnosed in 9834 she has complication of  retinopathy , neuropathy  her initial presentation to me was confusion and mental status changes due to metabolic hyperglycemic changes    Mil Pantoja has tried oral hypoglycemic agents in the past including  metformin ,Glipizide and Januvia ,she was on a Insulin 70/30 combination and ended up having frequent  hypoglycemic reactions --- her confusion on initial presentation  resolved after initiation of insulin basal bolus form in January 2010     Pt also has hypothyroidism & is on Lt4 daily --she has no family hx of thyroid disease or thyroid cancer she denies any radiation exposure. JAYSON also has hypertension and CAD and follows with Cardiology     She had lung infection in sept 2017 and she was on steroids and that messed up her control  Current diabetic medications include: basal bolus insulin regimen   She underwent stress testing in 2019 and no major coronary artery disease was detected at the time she follows with cardiology    INTERIM:    Diabetes  She presents for her follow-up diabetic visit. She has type 2 diabetes mellitus. No MedicAlert identification noted. The initial diagnosis of diabetes was made 25 years ago. Her disease course has been worsening. Hypoglycemia symptoms include nervousness/anxiousness, sweats and tremors. Pertinent negatives for diabetes include no foot ulcerations, no polydipsia, no polyphagia, no polyuria, no visual change, no weakness and no weight loss. There are no hypoglycemic complications. Symptoms are worsening. Diabetic complications include heart disease, peripheral neuropathy and retinopathy. Risk factors for coronary artery disease include diabetes mellitus, dyslipidemia, post-menopausal and hypertension. Current diabetic treatment includes intensive insulin program. She is compliant with treatment most of the time. Her weight is stable. She is following a generally healthy diet.  Meal planning includes avoidance of concentrated sweets. She has had a previous visit with a dietitian. She rarely participates in exercise. Her home blood glucose trend is increasing steadily. Her breakfast blood glucose is taken between 7-8 am. Her breakfast blood glucose range is generally  mg/dl. Her lunch blood glucose is taken between 12-1 pm. Her lunch blood glucose range is generally >200 mg/dl. Her dinner blood glucose is taken between 7-8 pm. Her dinner blood glucose range is generally >200 mg/dl. An ACE inhibitor/angiotensin II receptor blocker is being taken. She does not see a podiatrist.Eye exam is current. She underwent heart cath in august and was told to stick with medical management  Previously used noom program she was able to lose 50+ pounds, she continues to work on diet and exercise and follows the same guidelines. She has lost another 10 lbs Patient has been watching her diet and restricting her carbohydrates. Weight trend: stable  Prior  she has beenvisit with dietician: no  Current diet: on average, 3 meals per day  Current exercise: rare    Has there been any hospitalization, surgery or major illness since the last visit? yes  Has there been any new school, family or social problems since the last visit? No  Has there been any new family history of members with diabetes, heart disease, stroke, or endocrine related problems since the last visit?   No    Past Medical History:   Diagnosis Date    Anemia     Anxiety     Arthritis     Asthma     Chronic renal disease, stage III (HCC)     CKD (chronic kidney disease) stage 3, GFR 30-59 ml/min ([facility] 6/5/2019    Depression     Diabetes mellitus ([facility]     History of angina 2019    Hyperlipidemia     Hypertension     Hypothyroidism     Intercostal pain     Iron deficiency anemia, unspecified     Neuropathy     Obesity     Osteoarthritis     Retinopathy     bilat eyes     Thyroid disease       Patient Active Problem List   Diagnosis Uncontrolled type 2 diabetes mellitus with complication, with long-term current use of insulin    Essential hypertension    Mixed hyperlipidemia    Obesity (BMI 35.0-39.9 without comorbidity)    CKD (chronic kidney disease) stage 3, GFR 30-59 ml/min ([facility])     Past Surgical History:   Procedure Laterality Date    CHOLECYSTECTOMY      EYE SURGERY  2018    retinopathy     HERNIA REPAIR      OTHER SURGICAL HISTORY Right     thoracic surgery on rt side for empyema    OTHER SURGICAL HISTORY  2018    nerve block- back     OTHER SURGICAL HISTORY  03/2019    ablation back bilateral     TONSILLECTOMY       Social History     Socioeconomic History    Marital status:      Spouse name: Not on file    Number of children: Not on file    Years of education: Not on file    Highest education level: Not on file   Occupational History    Not on file   Tobacco Use    Smoking status: Never    Smokeless tobacco: Never   Vaping Use    Vaping Use: Never used   Substance and Sexual Activity    Alcohol use: No    Drug use: No    Sexual activity: Not on file   Other Topics Concern    Not on file   Social History Narrative    Not on file     Social Determinants of Health     Financial Resource Strain: Not on file   Food Insecurity: Not on file   Transportation Needs: Not on file   Physical Activity: Not on file   Stress: Not on file   Social Connections: Not on file   Intimate Partner Violence: Not on file   Housing Stability: Not on file     Family History   Problem Relation Age of Onset    Diabetes Father     High Blood Pressure Father     Breast Cancer Sister     High Blood Pressure Sister     Diabetes Maternal Grandmother      Current Outpatient Medications   Medication Sig Dispense Refill    amLODIPine (NORVASC) 5 MG tablet TAKE 1 TABLET BY MOUTH ONCE DAILY      nitroGLYCERIN (NITROSTAT) 0.4 MG SL tablet DISSOLVE ONE TABLET UNDER THE TONGUE EVERY 5 MINUTES AS NEEDED FOR CHEST PAIN.  DO NOT EXCEED A TOTAL OF 3 DOSES IN 15 MINUTES NOW losartan (COZAAR) 25 MG tablet TAKE 1 TABLET BY MOUTH ONCE DAILY      Cholecalciferol (VITAMIN D3) 1.25 MG (71094 UT) TABS Take 1 tablet by mouth every week (Patient taking differently: Take 1 tablet by mouth every 2 weeks) 12 tablet 1    isosorbide mononitrate (IMDUR) 60 MG extended release tablet 90 mg daily      HUMALOG KWIKPEN 100 UNIT/ML SOPN Inject 5 units three times daily with meals. 5 pen 3    Insulin Pen Needle (PEN NEEDLES) 31G X 5 MM MISC Use as directed 4 times daily 150 each 3    levothyroxine (SYNTHROID) 100 MCG tablet TAKE 1 TABLET BY MOUTH EVERY DAY 90 tablet 1    Insulin Glargine, 1 Unit Dial, (TOUJEO SOLOSTAR) 300 UNIT/ML SOPN Inject 36 units into the skin daily. (Patient taking differently: Inject 17-20 units into the skin daily. ) 15 pen 1    carvedilol (COREG) 6.25 MG tablet Take 6.25 mg by mouth 2 times daily (with meals)      empagliflozin (JARDIANCE) 10 MG tablet Take 1 tablet by mouth daily 30 tablet 5    buPROPion (WELLBUTRIN XL) 150 MG extended release tablet 300 mg 1 tab daily       blood glucose test strips (FREESTYLE LITE) strip USE TO TEST BLOOD SUGAR FOUR TIMES A  strip 2    omeprazole (PRILOSEC) 20 MG delayed release capsule Take 20 mg by mouth daily      ferrous sulfate 324 (65 Fe) MG EC tablet Take 2 tablets daily      diazepam (VALIUM) 2 MG tablet Take 2 mg by mouth as needed. .      fluticasone-vilanterol (BREO ELLIPTA) 100-25 MCG/INH AEPB inhaler INHALE ONE DOSE BY MOUTH DAILY      atorvastatin (LIPITOR) 20 MG tablet Take 20 mg by mouth 80 mg daily      aspirin 81 MG tablet Take 81 mg by mouth daily      albuterol sulfate  (90 Base) MCG/ACT inhaler Inhale 2 puffs into the lungs      albuterol (PROVENTIL) (2.5 MG/3ML) 0.083% nebulizer solution Inhale 2.5 mg into the lungs      cyanocobalamin 2000 MCG tablet Take 2,000 mcg by mouth daily. DULoxetine (CYMBALTA) 60 MG extended release capsule Take 60 mg by mouth 2 times daily.       fexofenadine (ALLEGRA) 180 MG tablet Take 180 mg by mouth daily Pt takes PRN      furosemide (LASIX) 40 MG tablet Take by mouth daily Alternate daily 40 mg with 80 mg every other day      Multiple Vitamins-Minerals (MULTIVITAMIN PO) Take  by mouth. No current facility-administered medications for this visit. No Known Allergies  Family Status   Relation Name Status    Father  (Not Specified)    Sister  (Not Specified)    MGM  (Not Specified)       OBJECTIVE:  BP 97/61   Pulse 66   Resp 16   Ht 5' 1\" (1.549 m)   Wt 177 lb (80.3 kg)   BMI 33.44 kg/m²    Wt Readings from Last 3 Encounters:   11/14/22 177 lb (80.3 kg)   07/18/22 187 lb (84.8 kg)   03/14/22 188 lb 3.2 oz (85.4 kg)         ROS  I have reviewed the review of system questionnaire filled by the patient .   Patient was advised to contact PCP for non endocrine signs and symptoms     Constitutional: no acute distress, well appearing, well nourished  Psychiatric: oriented to person, place and time, judgement, insight and normal, recent and remote memory and intact and mood, affect are normal  Skin: skin and subcutaneous tissue is normal without mass,   Head and Face: examination of head and face revealed no abnormalities  Eyes: no lid or conjunctival swelling, no erythema or discharge, pupils are normal,   Ears/Nose: external inspection of ears and nose revealed no abnormalities, hearing is grossly normal  Oropharynx/Mouth/Face: lips, tongue and gums are normal with no lesions, the voice quality was normal  Neck: neck is supple and symmetric, with midline trachea and no masses, thyroid is normal although difficult to palpate due to body habitus  Pulmonary: no increased work of breathing or signs of respiratory distress, lungs are clear to auscultation  Cardiovascular: normal heart rate and rhythm, normal S1 and S2,   Musculoskeletal: normal gait and station,   Neurological: normal coordination, normal general cortical function          Lab Results   Component Value Date/Time LABA1C 6.5 11/09/2022 12:24 PM    LABA1C 6.4 07/12/2022 11:04 AM    LABA1C 8.0 03/09/2022 10:44 AM         ASSESSMENT/PLAN:    T2DM with triopathy uncontrolled   A1c was 7.8>>8.5>>7.3>>8.6---control worsened Since last visit>>8.5 >>10.9>>9.4>>8.5>>9.4>>8.7>>7.9    Control is at target @ 6.4>>6.5    -She is on Basaglar 18 ---Reduce to 16 units as she is having some fasting hypoglycemia  humalog 2--3 units with some meals she was advised to increase the use of Humalog with a meal and was provided with a sliding scale insulin along with carb to insulin ratio of 10:1.  ----Jardiance 10 mg daily  She is avoiding high carb meals and is mostly doing doing very low carb and is not using   novolog with meals. She is following NOOM  Protocol and has lost 50 +  lbs total, no further weight loss since March 2022. Continues to have stable weight. Discussed Diet modification, Pt has been taking insulin as prescribed and denies having any unexplained hypoglycemic reactions. Pt is able to recognize hypoglycemia and correct it, denies any worsening of neuropathy       Health Maintenance Review:  Last Eye Exam:aug 2022 s/p laser   Lipids: Normal ldl 93 in nov 2022   Microalbumin/Creatinine Ratio: Pt has CKD and follows with Dr Lizzeth Segal now   Diabetic foot care discussed with patient in detail and patient advised to get yearly foot exam with podiatry. Hypothyroidism   Patient is on 100 mcg daily  of levothyroxine  TSH 0.54 in July 2022  Krzysztof1 TARYN Beal is currently biochemicaly  and clinically euthyroid on the  current dose of thyroid hormone replacement . Patient  is advised to take her thyroid hormone supplement on empty stomach without any concomitant Iron or calcium supplement .     VIt D level 32 >>28>>35>>65 in July 2020  She has been on 50,0000 Weekly dosing   Denies any kidney stones   She is on replacement   Most recent level in June 2019 is 35    THYROID NODULE LEFT LOBE 6 mm solid hypoechoeic nodule   Follow-up ultrasound in august 2021  Ultrasound in nov 2022 stable       OSTEOPENIA dex scan was in 5/2016 repeat in 2018   Osteopenia in march 2022   Optimize calcium in diet and Vit D     CKD   Follows with Dr Latosha Grimaldo   Her creatinine is 1.8> 1.4 >>1.99>>1.6 in oct 2019       Hyperlipidemia on meds follows with PCP on statins   She is on lipitor only ---ldl 86 >>100>>85 in June 2019 She was on fenofibrate in the past which I stopped in march 2016 due to elevated CPK   CK was 65 in sep 2016     HYPERTENSION on meds controlled   Continue with current regimen, slightly elevated today advised to check at home and let me know if it stays elevated    --- CHF   She is on Lasix   Follows with cardilogy she follows with   She has pulmonary hypertension     Asthma stable   ---Follows with Dr. Valaria Peabody current regimen       Reviewed and/or ordered clinical lab results yes   Reviewed and/or ordered radiology tests Yes  Reviewed and/or ordered other diagnostic tests yes   Made a decision to obtain old records yes   Reviewed and summarized old records yes     Ying Garcia was counseled regarding symptoms of current diagnosis, course and complications of disease if inadequately treated, side effects of medications, diagnosis, treatment options, and prognosis, risks, benefits, complications, and alternatives of treatment, labs, imaging and other studies and treatment targets and goals. She understands instructions and counseling    These diagnosis were discussed and reviewed with the patient including the advantages of drug therapy. She was counseled at this visit on the following: diabetes complication prevention and foot care.      Diabetes Continuous Glucose Monitoring Report         Reason for Study:     - improve diabetic control without risk of hypoglycemia       Current Medication regimen:   Basal bolus insulin regimen     CGMS Report     CGMS data collection was performed on nov 14 2022  Patient provided information on her  diet, activities and insulin dosing  during this period. Data was available for 14 days     Sensor Data Report:   - 12 AM to 6 AM: Overnight blood glucose pattern shows stable glycemia  - 6   AM to 10 AM:  Post breakfast minimal hypoglycemia is noted  --10AM to 5 PM : No hypoglycemia observed during this time. - 5   PM to 8 PM: Post meal hyperglycemia is noted       Average reading 131 mg/dL     Standard Dev 35 mg/dL   % of time <70 mg/dL less than 1%   % of time >180  mg/dL 9 %   % of time within range 91%  Number of hypoglycemia episodes noted: 0     Impression:   CGMS shows overall stable glycemia with some postprandial hyperglycemia     Recommendation:      Patient was advised to make the following changes in her diabetic regimen  Reduce long-acting insulin by 2 units              Return in about 3 months (around 2/14/2023).

## 2023-01-30 ENCOUNTER — TELEPHONE (OUTPATIENT)
Dept: ENDOCRINOLOGY | Age: 68
End: 2023-01-30

## 2023-02-16 ENCOUNTER — TELEPHONE (OUTPATIENT)
Dept: ENDOCRINOLOGY | Age: 68
End: 2023-02-16

## 2023-03-11 DIAGNOSIS — E04.1 THYROID NODULE: ICD-10-CM

## 2023-03-13 RX ORDER — LEVOTHYROXINE SODIUM 0.1 MG/1
TABLET ORAL
Qty: 180 TABLET | Refills: 0 | Status: SHIPPED | OUTPATIENT
Start: 2023-03-13

## 2023-03-20 ENCOUNTER — PATIENT MESSAGE (OUTPATIENT)
Dept: ENDOCRINOLOGY | Age: 68
End: 2023-03-20

## 2023-03-20 DIAGNOSIS — Z79.4 CONTROLLED TYPE 2 DIABETES MELLITUS WITH MICROALBUMINURIA, WITH LONG-TERM CURRENT USE OF INSULIN (HCC): Primary | ICD-10-CM

## 2023-03-20 DIAGNOSIS — E11.29 CONTROLLED TYPE 2 DIABETES MELLITUS WITH MICROALBUMINURIA, WITH LONG-TERM CURRENT USE OF INSULIN (HCC): Primary | ICD-10-CM

## 2023-03-20 DIAGNOSIS — R80.9 CONTROLLED TYPE 2 DIABETES MELLITUS WITH MICROALBUMINURIA, WITH LONG-TERM CURRENT USE OF INSULIN (HCC): Primary | ICD-10-CM

## 2023-03-20 RX ORDER — EMPAGLIFLOZIN 10 MG/1
TABLET, FILM COATED ORAL
Qty: 30 TABLET | Refills: 0 | Status: SHIPPED | OUTPATIENT
Start: 2023-03-20

## 2023-03-20 RX ORDER — INSULIN GLARGINE 100 [IU]/ML
INJECTION, SOLUTION SUBCUTANEOUS
Qty: 15 ML | Refills: 3 | Status: SHIPPED | OUTPATIENT
Start: 2023-03-20

## 2023-03-20 NOTE — TELEPHONE ENCOUNTER
From: Nehemiah Short  To: Dr. Ana María Palm  Sent: 3/20/2023 2:01 PM EDT  Subject: Long acting insulin    Could you put in a prescription for Basglar. My insurance is no longer covering Trujeo as a preferred insulin. I am still using [facility] [address].    Netta Silver [birth year]

## 2023-04-05 DIAGNOSIS — R80.9 CONTROLLED TYPE 2 DIABETES MELLITUS WITH MICROALBUMINURIA, WITH LONG-TERM CURRENT USE OF INSULIN (HCC): ICD-10-CM

## 2023-04-05 DIAGNOSIS — E03.9 ACQUIRED HYPOTHYROIDISM: ICD-10-CM

## 2023-04-05 DIAGNOSIS — Z79.4 CONTROLLED TYPE 2 DIABETES MELLITUS WITH MICROALBUMINURIA, WITH LONG-TERM CURRENT USE OF INSULIN (HCC): ICD-10-CM

## 2023-04-05 DIAGNOSIS — E11.29 CONTROLLED TYPE 2 DIABETES MELLITUS WITH MICROALBUMINURIA, WITH LONG-TERM CURRENT USE OF INSULIN (HCC): ICD-10-CM

## 2023-04-05 DIAGNOSIS — M85.851 OSTEOPENIA OF NECK OF RIGHT FEMUR: ICD-10-CM

## 2023-04-05 LAB
25(OH)D3 SERPL-MCNC: 50.1 NG/ML
ALBUMIN SERPL-MCNC: 3.8 G/DL (ref 3.4–5)
ALBUMIN/GLOB SERPL: 1.7 {RATIO} (ref 1.1–2.2)
ALP SERPL-CCNC: 105 U/L (ref 40–129)
ALT SERPL-CCNC: 21 U/L (ref 10–40)
ANION GAP SERPL CALCULATED.3IONS-SCNC: 14 MMOL/L (ref 3–16)
AST SERPL-CCNC: 14 U/L (ref 15–37)
BILIRUB SERPL-MCNC: 0.3 MG/DL (ref 0–1)
BUN SERPL-MCNC: 21 MG/DL (ref 7–20)
CALCIUM SERPL-MCNC: 8.9 MG/DL (ref 8.3–10.6)
CHLORIDE SERPL-SCNC: 100 MMOL/L (ref 99–110)
CHOLEST SERPL-MCNC: 110 MG/DL (ref 0–199)
CO2 SERPL-SCNC: 25 MMOL/L (ref 21–32)
CREAT SERPL-MCNC: 1.6 MG/DL (ref 0.6–1.2)
CREAT UR-MCNC: 70.6 MG/DL (ref 28–259)
GFR SERPLBLD CREATININE-BSD FMLA CKD-EPI: 35 ML/MIN/{1.73_M2}
GLUCOSE SERPL-MCNC: 112 MG/DL (ref 70–99)
HDLC SERPL-MCNC: 46 MG/DL (ref 40–60)
LDLC SERPL CALC-MCNC: 47 MG/DL
MICROALBUMIN UR DL<=1MG/L-MCNC: 7.3 MG/DL
MICROALBUMIN/CREAT UR: 103.4 MG/G (ref 0–30)
POTASSIUM SERPL-SCNC: 3.9 MMOL/L (ref 3.5–5.1)
PROT SERPL-MCNC: 6 G/DL (ref 6.4–8.2)
SODIUM SERPL-SCNC: 139 MMOL/L (ref 136–145)
TRIGL SERPL-MCNC: 86 MG/DL (ref 0–150)
TSH SERPL DL<=0.005 MIU/L-ACNC: 0.47 UIU/ML (ref 0.27–4.2)
VLDLC SERPL CALC-MCNC: 17 MG/DL

## 2023-04-06 LAB
EST. AVERAGE GLUCOSE BLD GHB EST-MCNC: 142.7 MG/DL
HBA1C MFR BLD: 6.6 %

## 2023-04-24 RX ORDER — EMPAGLIFLOZIN 10 MG/1
TABLET, FILM COATED ORAL
Qty: 90 TABLET | Refills: 1 | Status: SHIPPED | OUTPATIENT
Start: 2023-04-24

## 2023-05-01 ENCOUNTER — TELEPHONE (OUTPATIENT)
Dept: ENDOCRINOLOGY | Age: 68
End: 2023-05-01

## 2023-05-01 DIAGNOSIS — E55.9 VITAMIN D DEFICIENCY: ICD-10-CM

## 2023-05-01 NOTE — TELEPHONE ENCOUNTER
Fax from [facility] refill request for Vitamin D3    LOV   4-10-23  FOV    11-6-23

## 2023-06-19 ENCOUNTER — PATIENT MESSAGE (OUTPATIENT)
Dept: ENDOCRINOLOGY | Age: 68
End: 2023-06-19

## 2023-06-19 DIAGNOSIS — E11.22 CONTROLLED TYPE 2 DIABETES MELLITUS WITH STAGE 3 CHRONIC KIDNEY DISEASE, WITH LONG-TERM CURRENT USE OF INSULIN (HCC): Primary | ICD-10-CM

## 2023-06-19 DIAGNOSIS — Z79.4 CONTROLLED TYPE 2 DIABETES MELLITUS WITH STAGE 3 CHRONIC KIDNEY DISEASE, WITH LONG-TERM CURRENT USE OF INSULIN (HCC): Primary | ICD-10-CM

## 2023-06-19 DIAGNOSIS — N18.30 CONTROLLED TYPE 2 DIABETES MELLITUS WITH STAGE 3 CHRONIC KIDNEY DISEASE, WITH LONG-TERM CURRENT USE OF INSULIN (HCC): Primary | ICD-10-CM

## 2023-06-19 RX ORDER — INSULIN DEGLUDEC INJECTION 100 U/ML
INJECTION, SOLUTION SUBCUTANEOUS
Qty: 5 ADJUSTABLE DOSE PRE-FILLED PEN SYRINGE | Refills: 3 | Status: SHIPPED | OUTPATIENT
Start: 2023-06-19

## 2023-06-19 NOTE — TELEPHONE ENCOUNTER
----- Message from [address] [place] [address]. [facility] sent at 6/17/2023 10:45 PM EDT -----  Regarding: Ángela Jason  prescription problem  Contact: [phone]  Could you please send in a new 30  day prescription for up to 36 units like my prior prescription. Due to insurance [facility] only gave me a 17 supply of only one pen which doubles  my monthly cost and cuts my insulin too close. I would appreciate your help.   Ciera Reynolds [birth year]

## 2023-06-20 RX ORDER — INSULIN ASPART 100 [IU]/ML
INJECTION, SOLUTION INTRAVENOUS; SUBCUTANEOUS
Qty: 15 ML | Refills: 3 | Status: SHIPPED | OUTPATIENT
Start: 2023-06-20 | End: 2023-06-23

## 2023-06-20 NOTE — TELEPHONE ENCOUNTER
From: Nicole Clark  To: Dr. Wendy Diaz  Sent: 6/19/2023 4:48 PM EDT  Subject: Tacos Blair    Could you also put in for a new prescription for Novalog. I need to switch to it from humalog due to insurance coverage. I appreciate your help!   Ciera Reynolds [birth year]

## 2023-06-23 ENCOUNTER — TELEPHONE (OUTPATIENT)
Dept: ENDOCRINOLOGY | Age: 68
End: 2023-06-23

## 2023-06-23 DIAGNOSIS — Z79.4 CONTROLLED TYPE 2 DIABETES MELLITUS WITH STAGE 3 CHRONIC KIDNEY DISEASE, WITH LONG-TERM CURRENT USE OF INSULIN (HCC): Primary | ICD-10-CM

## 2023-06-23 DIAGNOSIS — N18.30 CONTROLLED TYPE 2 DIABETES MELLITUS WITH STAGE 3 CHRONIC KIDNEY DISEASE, WITH LONG-TERM CURRENT USE OF INSULIN (HCC): Primary | ICD-10-CM

## 2023-06-23 DIAGNOSIS — E11.22 CONTROLLED TYPE 2 DIABETES MELLITUS WITH STAGE 3 CHRONIC KIDNEY DISEASE, WITH LONG-TERM CURRENT USE OF INSULIN (HCC): Primary | ICD-10-CM

## 2023-06-23 RX ORDER — INSULIN LISPRO 100 [IU]/ML
INJECTION, SOLUTION INTRAVENOUS; SUBCUTANEOUS
Qty: 15 ML | Refills: 3 | Status: SHIPPED | OUTPATIENT
Start: 2023-06-23

## 2023-06-23 NOTE — TELEPHONE ENCOUNTER
Call from [facility] stating that the pts insurance will not cover Novolog but they will cover Humalog     Pharmacist is wanting to know if Dr. Kajal Schwartz would send in a new script for Humalog     Please advise

## 2023-09-21 DIAGNOSIS — E04.1 THYROID NODULE: ICD-10-CM

## 2023-09-22 RX ORDER — LEVOTHYROXINE SODIUM 0.1 MG/1
TABLET ORAL
Qty: 180 TABLET | Refills: 0 | Status: SHIPPED | OUTPATIENT
Start: 2023-09-22

## 2023-09-25 DIAGNOSIS — N18.30 CONTROLLED TYPE 2 DIABETES MELLITUS WITH STAGE 3 CHRONIC KIDNEY DISEASE, WITH LONG-TERM CURRENT USE OF INSULIN (HCC): Primary | ICD-10-CM

## 2023-09-25 DIAGNOSIS — Z79.4 CONTROLLED TYPE 2 DIABETES MELLITUS WITH STAGE 3 CHRONIC KIDNEY DISEASE, WITH LONG-TERM CURRENT USE OF INSULIN (HCC): Primary | ICD-10-CM

## 2023-09-25 DIAGNOSIS — E11.22 CONTROLLED TYPE 2 DIABETES MELLITUS WITH STAGE 3 CHRONIC KIDNEY DISEASE, WITH LONG-TERM CURRENT USE OF INSULIN (HCC): Primary | ICD-10-CM

## 2023-09-26 ENCOUNTER — TELEPHONE (OUTPATIENT)
Dept: ENDOCRINOLOGY | Age: 68
End: 2023-09-26

## 2023-10-31 ENCOUNTER — TELEPHONE (OUTPATIENT)
Dept: ENDOCRINOLOGY | Age: 68
End: 2023-10-31

## 2023-11-01 DIAGNOSIS — Z79.4 CONTROLLED TYPE 2 DIABETES MELLITUS WITH STAGE 3 CHRONIC KIDNEY DISEASE, WITH LONG-TERM CURRENT USE OF INSULIN (HCC): ICD-10-CM

## 2023-11-01 DIAGNOSIS — E11.22 CONTROLLED TYPE 2 DIABETES MELLITUS WITH STAGE 3 CHRONIC KIDNEY DISEASE, WITH LONG-TERM CURRENT USE OF INSULIN (HCC): ICD-10-CM

## 2023-11-01 DIAGNOSIS — I10 ESSENTIAL HYPERTENSION: ICD-10-CM

## 2023-11-01 DIAGNOSIS — E78.2 MIXED HYPERLIPIDEMIA: ICD-10-CM

## 2023-11-01 DIAGNOSIS — N18.31 STAGE 3A CHRONIC KIDNEY DISEASE (HCC): ICD-10-CM

## 2023-11-01 DIAGNOSIS — N18.30 CONTROLLED TYPE 2 DIABETES MELLITUS WITH STAGE 3 CHRONIC KIDNEY DISEASE, WITH LONG-TERM CURRENT USE OF INSULIN (HCC): ICD-10-CM

## 2023-11-01 LAB
ALBUMIN SERPL-MCNC: 4 G/DL (ref 3.4–5)
ALBUMIN/GLOB SERPL: 1.5 {RATIO} (ref 1.1–2.2)
ALP SERPL-CCNC: 95 U/L (ref 40–129)
ALT SERPL-CCNC: 19 U/L (ref 10–40)
ANION GAP SERPL CALCULATED.3IONS-SCNC: 8 MMOL/L (ref 3–16)
AST SERPL-CCNC: 16 U/L (ref 15–37)
BILIRUB SERPL-MCNC: 0.3 MG/DL (ref 0–1)
BUN SERPL-MCNC: 28 MG/DL (ref 7–20)
CALCIUM SERPL-MCNC: 9.6 MG/DL (ref 8.3–10.6)
CHLORIDE SERPL-SCNC: 102 MMOL/L (ref 99–110)
CHOLEST SERPL-MCNC: 136 MG/DL (ref 0–199)
CO2 SERPL-SCNC: 30 MMOL/L (ref 21–32)
CREAT SERPL-MCNC: 1.4 MG/DL (ref 0.6–1.2)
CREAT UR-MCNC: 79.8 MG/DL (ref 28–259)
GFR SERPLBLD CREATININE-BSD FMLA CKD-EPI: 41 ML/MIN/{1.73_M2}
GLUCOSE SERPL-MCNC: 108 MG/DL (ref 70–99)
HDLC SERPL-MCNC: 47 MG/DL (ref 40–60)
LDLC SERPL CALC-MCNC: 70 MG/DL
MICROALBUMIN UR DL<=1MG/L-MCNC: 2.9 MG/DL
MICROALBUMIN/CREAT UR: 36.3 MG/G (ref 0–30)
POTASSIUM SERPL-SCNC: 4.1 MMOL/L (ref 3.5–5.1)
PROT SERPL-MCNC: 6.6 G/DL (ref 6.4–8.2)
SODIUM SERPL-SCNC: 140 MMOL/L (ref 136–145)
TRIGL SERPL-MCNC: 97 MG/DL (ref 0–150)
TSH SERPL DL<=0.005 MIU/L-ACNC: 0.39 UIU/ML (ref 0.27–4.2)
VLDLC SERPL CALC-MCNC: 19 MG/DL

## 2023-11-02 LAB
EST. AVERAGE GLUCOSE BLD GHB EST-MCNC: 145.6 MG/DL
HBA1C MFR BLD: 6.7 %

## 2023-11-06 ENCOUNTER — OFFICE VISIT (OUTPATIENT)
Dept: ENDOCRINOLOGY | Age: 68
End: 2023-11-06
Payer: MEDICARE

## 2023-11-06 VITALS
HEART RATE: 70 BPM | WEIGHT: 169.4 LBS | RESPIRATION RATE: 16 BRPM | DIASTOLIC BLOOD PRESSURE: 71 MMHG | SYSTOLIC BLOOD PRESSURE: 113 MMHG | BODY MASS INDEX: 31.98 KG/M2 | HEIGHT: 61 IN

## 2023-11-06 DIAGNOSIS — I10 ESSENTIAL HYPERTENSION: ICD-10-CM

## 2023-11-06 DIAGNOSIS — E66.9 OBESITY (BMI 35.0-39.9 WITHOUT COMORBIDITY): ICD-10-CM

## 2023-11-06 DIAGNOSIS — N18.31 STAGE 3A CHRONIC KIDNEY DISEASE (HCC): ICD-10-CM

## 2023-11-06 DIAGNOSIS — Z79.4 CONTROLLED TYPE 2 DIABETES MELLITUS WITH COMPLICATION, WITH LONG-TERM CURRENT USE OF INSULIN (HCC): Primary | ICD-10-CM

## 2023-11-06 DIAGNOSIS — E78.2 MIXED HYPERLIPIDEMIA: ICD-10-CM

## 2023-11-06 DIAGNOSIS — E11.8 CONTROLLED TYPE 2 DIABETES MELLITUS WITH COMPLICATION, WITH LONG-TERM CURRENT USE OF INSULIN (HCC): Primary | ICD-10-CM

## 2023-11-06 PROCEDURE — G8417 CALC BMI ABV UP PARAM F/U: HCPCS | Performed by: INTERNAL MEDICINE

## 2023-11-06 PROCEDURE — 1090F PRES/ABSN URINE INCON ASSESS: CPT | Performed by: INTERNAL MEDICINE

## 2023-11-06 PROCEDURE — G8484 FLU IMMUNIZE NO ADMIN: HCPCS | Performed by: INTERNAL MEDICINE

## 2023-11-06 PROCEDURE — 99214 OFFICE O/P EST MOD 30 MIN: CPT | Performed by: INTERNAL MEDICINE

## 2023-11-06 PROCEDURE — 3017F COLORECTAL CA SCREEN DOC REV: CPT | Performed by: INTERNAL MEDICINE

## 2023-11-06 PROCEDURE — 1036F TOBACCO NON-USER: CPT | Performed by: INTERNAL MEDICINE

## 2023-11-06 PROCEDURE — 3044F HG A1C LEVEL LT 7.0%: CPT | Performed by: INTERNAL MEDICINE

## 2023-11-06 PROCEDURE — 2022F DILAT RTA XM EVC RTNOPTHY: CPT | Performed by: INTERNAL MEDICINE

## 2023-11-06 PROCEDURE — 3078F DIAST BP <80 MM HG: CPT | Performed by: INTERNAL MEDICINE

## 2023-11-06 PROCEDURE — 1123F ACP DISCUSS/DSCN MKR DOCD: CPT | Performed by: INTERNAL MEDICINE

## 2023-11-06 PROCEDURE — 3074F SYST BP LT 130 MM HG: CPT | Performed by: INTERNAL MEDICINE

## 2023-11-06 PROCEDURE — G8400 PT W/DXA NO RESULTS DOC: HCPCS | Performed by: INTERNAL MEDICINE

## 2023-11-06 PROCEDURE — G8427 DOCREV CUR MEDS BY ELIG CLIN: HCPCS | Performed by: INTERNAL MEDICINE

## 2023-11-06 PROCEDURE — 95251 CONT GLUC MNTR ANALYSIS I&R: CPT | Performed by: INTERNAL MEDICINE

## 2023-11-06 RX ORDER — LOSARTAN POTASSIUM 25 MG/1
TABLET ORAL
COMMUNITY
Start: 2023-10-04

## 2023-11-06 RX ORDER — CHOLECALCIFEROL (VITAMIN D3) 1250 MCG
1 CAPSULE ORAL WEEKLY
COMMUNITY
Start: 2023-08-15

## 2023-11-06 RX ORDER — ACYCLOVIR 400 MG/1
TABLET ORAL
Qty: 3 EACH | Refills: 3 | Status: SHIPPED | OUTPATIENT
Start: 2023-11-06

## 2023-11-06 RX ORDER — ATORVASTATIN CALCIUM 80 MG/1
80 TABLET, FILM COATED ORAL DAILY
COMMUNITY
Start: 2023-10-30

## 2023-11-06 RX ORDER — ACYCLOVIR 400 MG/1
TABLET ORAL
Qty: 1 EACH | Refills: 3 | Status: SHIPPED | OUTPATIENT
Start: 2023-11-06

## 2023-11-06 RX ORDER — BUPROPION HYDROCHLORIDE 300 MG/1
300 TABLET ORAL DAILY
COMMUNITY
Start: 2023-11-02

## 2023-11-06 RX ORDER — PEN NEEDLE, DIABETIC 30 GX3/16"
NEEDLE, DISPOSABLE MISCELLANEOUS
Qty: 150 EACH | Refills: 3 | Status: SHIPPED | OUTPATIENT
Start: 2023-11-06

## 2023-11-06 ASSESSMENT — ENCOUNTER SYMPTOMS: VISUAL CHANGE: 0

## 2023-11-06 NOTE — PROGRESS NOTES
planning includes avoidance of concentrated sweets. She has had a previous visit with a dietitian. She rarely participates in exercise. Her home blood glucose trend is increasing steadily. Her breakfast blood glucose is taken between 7-8 am. Her breakfast blood glucose range is generally  mg/dl. Her lunch blood glucose is taken between 12-1 pm. Her lunch blood glucose range is generally >200 mg/dl. Her dinner blood glucose is taken between 7-8 pm. Her dinner blood glucose range is generally >200 mg/dl. An ACE inhibitor/angiotensin II receptor blocker is being taken. She does not see a podiatrist.Eye exam is current. Previously used noom program she was able to lose 70 + pounds, she continues to work on diet and exercise and follows the same guidelines.     Denies any hypoglycemia      Past Medical History:   Diagnosis Date    Anemia     Anxiety     Arthritis     Asthma     Chronic renal disease, stage III ([facility])     CKD (chronic kidney disease) stage 3, GFR 30-59 ml/min ([facility]) 6/5/2019    Depression     Diabetes mellitus ([address])     History of angina 2019    Hyperlipidemia     Hypertension     Hypothyroidism     Intercostal pain     Iron deficiency anemia, unspecified     Neuropathy     Obesity     Osteoarthritis     Retinopathy     bilat eyes     Thyroid disease       Patient Active Problem List   Diagnosis    Uncontrolled type 2 diabetes mellitus with complication, with long-term current use of insulin    Essential hypertension    Mixed hyperlipidemia    Obesity (BMI 35.0-39.9 without comorbidity)    CKD (chronic kidney disease) stage 3, GFR 30-59 ml/min ([facility])     Past Surgical History:   Procedure Laterality Date    CHOLECYSTECTOMY      EYE SURGERY  2018    retinopathy     HERNIA REPAIR      OTHER SURGICAL HISTORY Right     thoracic surgery on rt side for empyema    OTHER SURGICAL HISTORY  2018    nerve block- back     OTHER SURGICAL HISTORY  03/2019    ablation back bilateral     TONSILLECTOMY

## 2023-11-06 NOTE — PATIENT INSTRUCTIONS
Patient Education        semaglutide (oral/injection)  Pronunciation:  ARJUN a GLOO tide  Brand:  Ozempic, Ozempic (1 mg dose), Rybelsus, Wegovy (0.25 mg dose), Wegovy (0.5 mg dose), Wegovy (1 mg dose), Wegovy (1.7 mg dose), Wegovy (2.4 mg dose)  What is the most important information I should know about semaglutide? Call your doctor at once if you have signs of a thyroid tumor, such as swelling or a lump in your neck, trouble swallowing, a hoarse voice, or shortness of breath. You should not use semaglutide if you have multiple endocrine neoplasia type 2 (tumors in your glands), or a personal or family history of medullary thyroid cancer. What is semaglutide? Semaglutide (Rybelsus and Ozempic) is used with diet and exercise to improve blood sugar control in adults with type 2 diabetes mellitus (not for type 1 diabetes). Sigmund Gave is used with diet and exercise to manage weight in overweight adults who also have least one weight-related medical condition such as type 2 diabetes, high blood pressure, or high cholesterol. Semaglutide may also be used for purposes not listed in this medication guide. What should I discuss with my healthcare provider before using semaglutide? You should not use semaglutide if you are allergic to it, or if you have:  multiple endocrine neoplasia type 2 (tumors in your glands); or  a personal or family history of medullary thyroid carcinoma (a type of thyroid cancer). Tell your doctor if you have ever had:  a stomach or intestinal disorder;  pancreatitis;  kidney disease; or  eye problems caused by diabetes (retinopathy). In animal studies, semaglutide caused thyroid tumors or thyroid cancer. It is not known whether these effects would occur in people. Ask your doctor about your risk. Stop using this medicine at least 2 months before you plan to get pregnant. Ask your doctor for a safer medicine to use during this time.  Controlling diabetes is very important during pregnancy, as is

## 2023-11-08 ENCOUNTER — PATIENT MESSAGE (OUTPATIENT)
Dept: ENDOCRINOLOGY | Age: 68
End: 2023-11-08

## 2023-11-08 DIAGNOSIS — E11.8 CONTROLLED TYPE 2 DIABETES MELLITUS WITH COMPLICATION, WITH LONG-TERM CURRENT USE OF INSULIN (HCC): Primary | ICD-10-CM

## 2023-11-08 DIAGNOSIS — Z79.4 CONTROLLED TYPE 2 DIABETES MELLITUS WITH COMPLICATION, WITH LONG-TERM CURRENT USE OF INSULIN (HCC): Primary | ICD-10-CM

## 2023-11-08 RX ORDER — ACYCLOVIR 400 MG/1
TABLET ORAL
Qty: 3 EACH | Refills: 4 | Status: SHIPPED | OUTPATIENT
Start: 2023-11-08

## 2023-11-08 RX ORDER — ACYCLOVIR 400 MG/1
TABLET ORAL
Qty: 1 EACH | Refills: 0 | Status: SHIPPED | OUTPATIENT
Start: 2023-11-08

## 2023-11-08 NOTE — TELEPHONE ENCOUNTER
From: An Galloway  To: Dr. Javy Chandra  Sent: 11/8/2023 11:43 AM EST  Subject: Dexcom 7    I have been getting my Dexcom6 through [address] [place] [address]. They bill it as medical equipment instead of pharmaceutical. It is much cheaper than [facility] and comes to my house. I talked to them and they said they would call you for a Dexcom seven prescription.   Thanks, Dustin Sanches [birth year]

## 2023-11-10 ENCOUNTER — TELEPHONE (OUTPATIENT)
Dept: ENDOCRINOLOGY | Age: 68
End: 2023-11-10

## 2023-12-04 RX ORDER — CHOLECALCIFEROL (VITAMIN D3) 1250 MCG
1 CAPSULE ORAL WEEKLY
Qty: 12 CAPSULE | Refills: 0 | Status: SHIPPED | OUTPATIENT
Start: 2023-12-04

## 2023-12-04 NOTE — TELEPHONE ENCOUNTER
Requested Prescriptions     Signed Prescriptions Disp Refills    Cholecalciferol (VITAMIN D3) 1.25 MG (73168 UT) CAPS 12 capsule 0     Sig: Take 1 capsule by mouth once a week     Authorizing Provider: Lieutenant Orozco     Ordering User: Molly Vaughn

## 2024-02-05 ENCOUNTER — PATIENT MESSAGE (OUTPATIENT)
Dept: ENDOCRINOLOGY | Age: 69
End: 2024-02-05

## 2024-02-07 NOTE — TELEPHONE ENCOUNTER
Fax from [employer] stating pt has been approved for Pt Assistance through 12/31/24    A 4 month supply will ship to office

## 2024-02-14 ENCOUNTER — TELEPHONE (OUTPATIENT)
Dept: ENDOCRINOLOGY | Age: 69
End: 2024-02-14

## 2024-02-14 NOTE — TELEPHONE ENCOUNTER
Fax from  patient assistance    The rx we have is missing pt identifiers. Please fax us new rx or call our pharmacy    -missing proof of income

## 2024-02-28 NOTE — TELEPHONE ENCOUNTER
LMOM informing patient that Tresiba was delivered to our [place] office. Also informed that she should have received a letter from  stating she was denied the patient assistance for Jardiance. She will have to contact  for more details.

## 2024-03-21 DIAGNOSIS — E04.1 THYROID NODULE: ICD-10-CM

## 2024-03-21 RX ORDER — LEVOTHYROXINE SODIUM 0.1 MG/1
TABLET ORAL
Qty: 180 TABLET | Refills: 0 | Status: SHIPPED | OUTPATIENT
Start: 2024-03-21

## 2024-04-11 DIAGNOSIS — E55.9 VITAMIN D DEFICIENCY: Primary | ICD-10-CM

## 2024-04-12 RX ORDER — CHOLECALCIFEROL (VITAMIN D3) 1250 MCG
1 CAPSULE ORAL WEEKLY
Qty: 12 CAPSULE | Refills: 0 | Status: SHIPPED | OUTPATIENT
Start: 2024-04-12

## 2024-04-12 NOTE — TELEPHONE ENCOUNTER
Requested Prescriptions     Pending Prescriptions Disp Refills    VITAMIN D3 1.25 MG (82640 UT) CAPS [Pharmacy Med Name: Vitamin D3 1.25 MG (93444 UT) Oral Capsule] 12 capsule 0     Sig: Take 1 capsule by mouth once a week       [id number] - [place], [place] - [address] [facility] -  [phone] - F [phone] [facility] [address]  Phone: [phone] Fax: [phone]    [place], [place] - [address] [facility] [phone] - F [phone]  [address] [facility] [address]  Phone: [phone] Fax: [phone]

## 2024-04-24 ENCOUNTER — TELEPHONE (OUTPATIENT)
Dept: ENDOCRINOLOGY | Age: 69
End: 2024-04-24

## 2024-04-24 NOTE — TELEPHONE ENCOUNTER
Left VM for patient that her patient assistance medications are here at the [facility]. Advised we can bring them to the [place] office for  if she can let us know when she plans on picking it up.

## 2024-04-25 ENCOUNTER — PATIENT MESSAGE (OUTPATIENT)
Dept: ENDOCRINOLOGY | Age: 69
End: 2024-04-25

## 2024-04-25 NOTE — TELEPHONE ENCOUNTER
From: Radha Dennis  To: Dr. Constance Kelley  Sent: 4/25/2024 12:17 PM EDT  Subject: Ozempic instructions needed    I picked up Ozempic from your [place] office but need directions for taking it. Dosage….how often….with or without food etc. Thanks, Radha Dennis [birth year]

## 2024-05-08 DIAGNOSIS — Z79.4 CONTROLLED TYPE 2 DIABETES MELLITUS WITH COMPLICATION, WITH LONG-TERM CURRENT USE OF INSULIN (HCC): ICD-10-CM

## 2024-05-08 DIAGNOSIS — E66.9 OBESITY (BMI 35.0-39.9 WITHOUT COMORBIDITY): ICD-10-CM

## 2024-05-08 DIAGNOSIS — N18.31 STAGE 3A CHRONIC KIDNEY DISEASE (HCC): ICD-10-CM

## 2024-05-08 DIAGNOSIS — I10 ESSENTIAL HYPERTENSION: ICD-10-CM

## 2024-05-08 DIAGNOSIS — E11.8 CONTROLLED TYPE 2 DIABETES MELLITUS WITH COMPLICATION, WITH LONG-TERM CURRENT USE OF INSULIN (HCC): ICD-10-CM

## 2024-05-08 DIAGNOSIS — E78.2 MIXED HYPERLIPIDEMIA: ICD-10-CM

## 2024-05-08 LAB
ALBUMIN SERPL-MCNC: 4.1 G/DL (ref 3.4–5)
ALBUMIN/GLOB SERPL: 1.6 {RATIO} (ref 1.1–2.2)
ALP SERPL-CCNC: 102 U/L (ref 40–129)
ALT SERPL-CCNC: 17 U/L (ref 10–40)
ANION GAP SERPL CALCULATED.3IONS-SCNC: 10 MMOL/L (ref 3–16)
AST SERPL-CCNC: 18 U/L (ref 15–37)
BILIRUB SERPL-MCNC: 0.3 MG/DL (ref 0–1)
BUN SERPL-MCNC: 22 MG/DL (ref 7–20)
CALCIUM SERPL-MCNC: 9.7 MG/DL (ref 8.3–10.6)
CHLORIDE SERPL-SCNC: 103 MMOL/L (ref 99–110)
CHOLEST SERPL-MCNC: 134 MG/DL (ref 0–199)
CO2 SERPL-SCNC: 30 MMOL/L (ref 21–32)
CREAT SERPL-MCNC: 1.2 MG/DL (ref 0.6–1.2)
CREAT UR-MCNC: 87.8 MG/DL (ref 28–259)
GFR SERPLBLD CREATININE-BSD FMLA CKD-EPI: 49 ML/MIN/{1.73_M2}
GLUCOSE SERPL-MCNC: 127 MG/DL (ref 70–99)
HDLC SERPL-MCNC: 48 MG/DL (ref 40–60)
LDLC SERPL CALC-MCNC: 63 MG/DL
MICROALBUMIN UR DL<=1MG/L-MCNC: 1.8 MG/DL
MICROALBUMIN/CREAT UR: 20.5 MG/G (ref 0–30)
POTASSIUM SERPL-SCNC: 4 MMOL/L (ref 3.5–5.1)
PROT SERPL-MCNC: 6.6 G/DL (ref 6.4–8.2)
SODIUM SERPL-SCNC: 143 MMOL/L (ref 136–145)
TRIGL SERPL-MCNC: 115 MG/DL (ref 0–150)
TSH SERPL DL<=0.005 MIU/L-ACNC: 0.69 UIU/ML (ref 0.27–4.2)
VLDLC SERPL CALC-MCNC: 23 MG/DL

## 2024-05-09 LAB
EST. AVERAGE GLUCOSE BLD GHB EST-MCNC: 154.2 MG/DL
HBA1C MFR BLD: 7 %

## 2024-05-13 ENCOUNTER — OFFICE VISIT (OUTPATIENT)
Dept: ENDOCRINOLOGY | Age: 69
End: 2024-05-13
Payer: MEDICARE

## 2024-05-13 VITALS
DIASTOLIC BLOOD PRESSURE: 63 MMHG | RESPIRATION RATE: 16 BRPM | BODY MASS INDEX: 31.53 KG/M2 | HEART RATE: 71 BPM | HEIGHT: 61 IN | SYSTOLIC BLOOD PRESSURE: 106 MMHG | WEIGHT: 167 LBS

## 2024-05-13 DIAGNOSIS — E11.8 CONTROLLED TYPE 2 DIABETES MELLITUS WITH COMPLICATION, WITH LONG-TERM CURRENT USE OF INSULIN (HCC): Primary | ICD-10-CM

## 2024-05-13 DIAGNOSIS — Z79.4 CONTROLLED TYPE 2 DIABETES MELLITUS WITH COMPLICATION, WITH LONG-TERM CURRENT USE OF INSULIN (HCC): Primary | ICD-10-CM

## 2024-05-13 DIAGNOSIS — I50.9 HEART FAILURE, UNSPECIFIED HF CHRONICITY, UNSPECIFIED HEART FAILURE TYPE (HCC): ICD-10-CM

## 2024-05-13 DIAGNOSIS — E04.1 THYROID NODULE: ICD-10-CM

## 2024-05-13 DIAGNOSIS — N18.31 STAGE 3A CHRONIC KIDNEY DISEASE (HCC): ICD-10-CM

## 2024-05-13 PROCEDURE — 3078F DIAST BP <80 MM HG: CPT | Performed by: INTERNAL MEDICINE

## 2024-05-13 PROCEDURE — 3051F HG A1C>EQUAL 7.0%<8.0%: CPT | Performed by: INTERNAL MEDICINE

## 2024-05-13 PROCEDURE — G8400 PT W/DXA NO RESULTS DOC: HCPCS | Performed by: INTERNAL MEDICINE

## 2024-05-13 PROCEDURE — 1123F ACP DISCUSS/DSCN MKR DOCD: CPT | Performed by: INTERNAL MEDICINE

## 2024-05-13 PROCEDURE — 1090F PRES/ABSN URINE INCON ASSESS: CPT | Performed by: INTERNAL MEDICINE

## 2024-05-13 PROCEDURE — 2022F DILAT RTA XM EVC RTNOPTHY: CPT | Performed by: INTERNAL MEDICINE

## 2024-05-13 PROCEDURE — G8417 CALC BMI ABV UP PARAM F/U: HCPCS | Performed by: INTERNAL MEDICINE

## 2024-05-13 PROCEDURE — 95251 CONT GLUC MNTR ANALYSIS I&R: CPT | Performed by: INTERNAL MEDICINE

## 2024-05-13 PROCEDURE — 3074F SYST BP LT 130 MM HG: CPT | Performed by: INTERNAL MEDICINE

## 2024-05-13 PROCEDURE — G8427 DOCREV CUR MEDS BY ELIG CLIN: HCPCS | Performed by: INTERNAL MEDICINE

## 2024-05-13 PROCEDURE — 3017F COLORECTAL CA SCREEN DOC REV: CPT | Performed by: INTERNAL MEDICINE

## 2024-05-13 PROCEDURE — 99214 OFFICE O/P EST MOD 30 MIN: CPT | Performed by: INTERNAL MEDICINE

## 2024-05-13 PROCEDURE — 1036F TOBACCO NON-USER: CPT | Performed by: INTERNAL MEDICINE

## 2024-05-13 RX ORDER — LEVOTHYROXINE SODIUM 88 UG/1
88 TABLET ORAL DAILY
Qty: 90 TABLET | Refills: 1 | Status: SHIPPED | OUTPATIENT
Start: 2024-05-13

## 2024-05-13 RX ORDER — SEMAGLUTIDE 0.68 MG/ML
INJECTION, SOLUTION SUBCUTANEOUS
COMMUNITY

## 2024-05-13 RX ORDER — LEVOTHYROXINE SODIUM 0.1 MG/1
100 TABLET ORAL DAILY
Qty: 180 TABLET | Refills: 1 | Status: CANCELLED | OUTPATIENT
Start: 2024-05-13

## 2024-05-13 RX ORDER — DAPAGLIFLOZIN 5 MG/1
5 TABLET, FILM COATED ORAL EVERY MORNING
Qty: 30 TABLET | Refills: 3 | Status: SHIPPED | OUTPATIENT
Start: 2024-05-13

## 2024-05-13 ASSESSMENT — ENCOUNTER SYMPTOMS: VISUAL CHANGE: 0

## 2024-05-13 NOTE — PROGRESS NOTES
during this time.  - 5   PM to 8 PM: Post meal hyperglycemia is noted       Average reading 142 mg/dL     Standard Dev 37 mg/dL   % of time <70 mg/dL less than 1%   % of time >180  mg/dL 16%   % of time within range 82 %  Number of hypoglycemia episodes noted: 0     Impression:   CGMS shows overall stable glycemia with some postprandial hyperglycemia     Recommendation:      Patient was advised to make the following changes in her diabetic regimen  Reduce long-acting insulin as she goes up on Ozempic.              Return in about 6 months (around 11/13/2024).

## 2024-05-13 NOTE — PATIENT INSTRUCTIONS
Make sure friends and family know how to use it.  When should you call for help?   Call 911 anytime you think you may need emergency care. For example, call if:    You passed out (lost consciousness).     You are confused or cannot think clearly.     Your blood sugar is very high or very low.   Watch closely for changes in your health, and be sure to contact your doctor if:    Your blood sugar stays outside the level your doctor set for you.     You have any problems.   Where can you learn more?  Go to https://[email or web address].net/patientEd and enter R955 to learn more about \"Hypoglycemia: Care Instructions.\"  Current as of: October 2, 2023               Content Version: 14.0  © 2006-2024 [employer].   Care instructions adapted under license by [employer]. If you have questions about a medical condition or this instruction, always ask your healthcare professional. [employer] disclaims any warranty or liability for your use of this information.

## 2024-05-22 ENCOUNTER — TELEPHONE (OUTPATIENT)
Dept: ENDOCRINOLOGY | Age: 69
End: 2024-05-22

## 2024-05-22 NOTE — TELEPHONE ENCOUNTER
Fax from [facility]  120 day supply of medication will arrive 10-14 days    Re:Novolog & Tresiba

## 2024-06-24 ENCOUNTER — TELEPHONE (OUTPATIENT)
Dept: ENDOCRINOLOGY | Age: 69
End: 2024-06-24

## 2024-07-01 ENCOUNTER — TELEPHONE (OUTPATIENT)
Dept: ENDOCRINOLOGY | Age: 69
End: 2024-07-01

## 2024-07-05 ENCOUNTER — PATIENT MESSAGE (OUTPATIENT)
Dept: ENDOCRINOLOGY | Age: 69
End: 2024-07-05

## 2024-07-08 ENCOUNTER — TELEPHONE (OUTPATIENT)
Dept: ENDOCRINOLOGY | Age: 69
End: 2024-07-08

## 2024-07-08 NOTE — TELEPHONE ENCOUNTER
----- Message from Radha Dennis sent at 7/5/2024 12:28 PM EDT -----  Regarding: Nausea  Contact: [phone]  I wondered if there is something I can take to manage the periodic nausea I’m having until the Ozempic is out of my system?   Radha Dennis [birth year]

## 2024-08-04 DIAGNOSIS — E55.9 VITAMIN D DEFICIENCY: ICD-10-CM

## 2024-08-05 RX ORDER — CHOLECALCIFEROL (VITAMIN D3) 1250 MCG
1 CAPSULE ORAL WEEKLY
Qty: 12 CAPSULE | Refills: 0 | Status: SHIPPED | OUTPATIENT
Start: 2024-08-05

## 2024-09-18 ENCOUNTER — TELEPHONE (OUTPATIENT)
Dept: ENDOCRINOLOGY | Age: 69
End: 2024-09-18

## 2024-09-19 ENCOUNTER — TELEPHONE (OUTPATIENT)
Dept: ENDOCRINOLOGY | Age: 69
End: 2024-09-19

## 2024-09-19 ENCOUNTER — PATIENT MESSAGE (OUTPATIENT)
Dept: ENDOCRINOLOGY | Age: 69
End: 2024-09-19

## 2024-10-15 ENCOUNTER — TELEPHONE (OUTPATIENT)
Dept: ENDOCRINOLOGY | Age: 69
End: 2024-10-15

## 2024-10-15 NOTE — TELEPHONE ENCOUNTER
Fax from [facility]    Reorder form-records indicate pt is eligible to receive reorder for Ozempic .25

## 2024-10-15 NOTE — TELEPHONE ENCOUNTER
Per note on 7/3/24 patient did not tolerate Ozempic and chose to d/c.     LMOM for patient to call and confirm this.

## 2024-11-14 DIAGNOSIS — E04.1 THYROID NODULE: ICD-10-CM

## 2024-11-14 DIAGNOSIS — Z79.4 CONTROLLED TYPE 2 DIABETES MELLITUS WITH COMPLICATION, WITH LONG-TERM CURRENT USE OF INSULIN (HCC): ICD-10-CM

## 2024-11-14 DIAGNOSIS — E11.8 CONTROLLED TYPE 2 DIABETES MELLITUS WITH COMPLICATION, WITH LONG-TERM CURRENT USE OF INSULIN (HCC): ICD-10-CM

## 2024-11-15 ENCOUNTER — PATIENT MESSAGE (OUTPATIENT)
Dept: ENDOCRINOLOGY | Age: 69
End: 2024-11-15

## 2024-11-15 LAB
ALBUMIN SERPL-MCNC: 4.2 G/DL (ref 3.4–5)
ALBUMIN/GLOB SERPL: 2 {RATIO} (ref 1.1–2.2)
ALP SERPL-CCNC: 99 U/L (ref 40–129)
ALT SERPL-CCNC: 22 U/L (ref 10–40)
ANION GAP SERPL CALCULATED.3IONS-SCNC: 10 MMOL/L (ref 3–16)
AST SERPL-CCNC: 21 U/L (ref 15–37)
BILIRUB SERPL-MCNC: <0.2 MG/DL (ref 0–1)
BUN SERPL-MCNC: 42 MG/DL (ref 7–20)
CALCIUM SERPL-MCNC: 9.6 MG/DL (ref 8.3–10.6)
CHLORIDE SERPL-SCNC: 104 MMOL/L (ref 99–110)
CHOLEST SERPL-MCNC: 137 MG/DL (ref 0–199)
CO2 SERPL-SCNC: 28 MMOL/L (ref 21–32)
CREAT SERPL-MCNC: 1.4 MG/DL (ref 0.6–1.2)
CREAT UR-MCNC: 45.9 MG/DL (ref 28–259)
EST. AVERAGE GLUCOSE BLD GHB EST-MCNC: 148.5 MG/DL
GFR SERPLBLD CREATININE-BSD FMLA CKD-EPI: 41 ML/MIN/{1.73_M2}
GLUCOSE SERPL-MCNC: 136 MG/DL (ref 70–99)
HBA1C MFR BLD: 6.8 %
HDLC SERPL-MCNC: 51 MG/DL (ref 40–60)
LDLC SERPL CALC-MCNC: 67 MG/DL
MICROALBUMIN UR DL<=1MG/L-MCNC: <1.2 MG/DL
MICROALBUMIN/CREAT UR: NORMAL MG/G (ref 0–30)
POTASSIUM SERPL-SCNC: 4.1 MMOL/L (ref 3.5–5.1)
PROT SERPL-MCNC: 6.3 G/DL (ref 6.4–8.2)
SODIUM SERPL-SCNC: 142 MMOL/L (ref 136–145)
TRIGL SERPL-MCNC: 94 MG/DL (ref 0–150)
TSH SERPL DL<=0.005 MIU/L-ACNC: 1.49 UIU/ML (ref 0.27–4.2)
VLDLC SERPL CALC-MCNC: 19 MG/DL

## 2024-11-18 ENCOUNTER — TELEMEDICINE (OUTPATIENT)
Dept: ENDOCRINOLOGY | Age: 69
End: 2024-11-18
Payer: MEDICARE

## 2024-11-18 DIAGNOSIS — Z79.4 CONTROLLED TYPE 2 DIABETES MELLITUS WITH COMPLICATION, WITH LONG-TERM CURRENT USE OF INSULIN (HCC): ICD-10-CM

## 2024-11-18 DIAGNOSIS — E78.2 MIXED HYPERLIPIDEMIA: ICD-10-CM

## 2024-11-18 DIAGNOSIS — E66.9 OBESITY (BMI 35.0-39.9 WITHOUT COMORBIDITY): Primary | ICD-10-CM

## 2024-11-18 DIAGNOSIS — E11.8 CONTROLLED TYPE 2 DIABETES MELLITUS WITH COMPLICATION, WITH LONG-TERM CURRENT USE OF INSULIN (HCC): ICD-10-CM

## 2024-11-18 PROCEDURE — 1123F ACP DISCUSS/DSCN MKR DOCD: CPT | Performed by: INTERNAL MEDICINE

## 2024-11-18 PROCEDURE — 99214 OFFICE O/P EST MOD 30 MIN: CPT | Performed by: INTERNAL MEDICINE

## 2024-11-18 PROCEDURE — 95251 CONT GLUC MNTR ANALYSIS I&R: CPT | Performed by: INTERNAL MEDICINE

## 2024-11-18 PROCEDURE — 3044F HG A1C LEVEL LT 7.0%: CPT | Performed by: INTERNAL MEDICINE

## 2024-11-20 ENCOUNTER — TELEPHONE (OUTPATIENT)
Dept: ENDOCRINOLOGY | Age: 69
End: 2024-11-20

## 2024-11-20 NOTE — PROGRESS NOTES
Patient  is a 69 y.o. female seen for T2 DM diagnosed in 1992 she has complication of  retinopathy , neuropathy  her initial presentation to me was confusion and mental status changes due to metabolic hyperglycemic changes    JAYSON has tried oral hypoglycemic agents in the past including  metformin ,Glipizide and Januvia ,she was on a Insulin 70/30 combination and ended up having frequent  hypoglycemic reactions --- her confusion on initial presentation  resolved after initiation of insulin basal bolus form in January 2010     Pt also has hypothyroidism & is on Lt4 daily --she has no family hx of thyroid disease or thyroid cancer she denies any radiation exposure.    JAYSON also has hypertension and CAD and follows with Cardiology     She had lung infection in sept 2017 and she was on steroids and that messed up her control  Current diabetic medications include: basal bolus insulin regimen   She underwent stress testing in 2019 and no major coronary artery disease was detected at the time she follows with cardiology    INTERIM:      She has been weidht stable, started taking Ozempic 0.25 mg weekly dose in April 2024 no particular side effects  Denies any hypoglycemia      Past Medical History:   Diagnosis Date    Anemia     Anxiety     Arthritis     Asthma     Chronic renal disease, stage III ([facility])     CKD (chronic kidney disease) stage 3, GFR 30-59 ml/min ([facility]) 6/5/2019    Depression     Diabetes mellitus ([facility])     History of angina 2019    Hyperlipidemia     Hypertension     Hypothyroidism     Intercostal pain     Iron deficiency anemia, unspecified     Neuropathy     Obesity     Osteoarthritis     Retinopathy     bilat eyes     Thyroid disease       Patient Active Problem List   Diagnosis    Uncontrolled type 2 diabetes mellitus with complication, with long-term current use of insulin    Essential hypertension    Mixed hyperlipidemia    Obesity (BMI 35.0-39.9 without comorbidity)    CKD (chronic kidney disease)

## 2024-11-20 NOTE — TELEPHONE ENCOUNTER
----- Message from Dr. Constance Kelley MD sent at 11/20/2024 12:24 PM EST -----  Please schedule patient a follow-up visit in 6 months and please confirm what dose of Ozempic is she currently taking?

## 2024-11-22 DIAGNOSIS — E55.9 VITAMIN D DEFICIENCY: ICD-10-CM

## 2024-11-25 RX ORDER — CHOLECALCIFEROL (VITAMIN D3) 1250 MCG
1 CAPSULE ORAL WEEKLY
Qty: 12 CAPSULE | Refills: 0 | Status: SHIPPED | OUTPATIENT
Start: 2024-11-25

## 2024-11-25 NOTE — TELEPHONE ENCOUNTER
Medication:   Requested Prescriptions     Pending Prescriptions Disp Refills    Cholecalciferol (VITAMIN D3) 1.25 MG (63341 UT) CAPS [Pharmacy Med Name: Vitamin D3 1.25 MG (87178 UT) Oral Capsule] 12 capsule 0     Sig: Take 1 capsule by mouth once a week         Last appt: Visit date not found   Next appt: 11/18/2024    Last Labs DM:   Lab Results   Component Value Date/Time    LABA1C 6.8 11/14/2024 08:51 AM     Last Lipid:   Lab Results   Component Value Date/Time    CHOL 137 11/14/2024 08:51 AM    TRIG 94 11/14/2024 08:51 AM    HDL 51 11/14/2024 08:51 AM     Last PSA: No results found for: \"PSA\"  Last Thyroid:   Lab Results   Component Value Date/Time    TSH 1.49 11/14/2024 08:51 AM    TSH 1.25 07/28/2021 07:22 AM    T4FREE 0.92 04/25/2018 12:00 AM

## 2025-01-20 RX ORDER — EMPAGLIFLOZIN 25 MG/1
25 TABLET, FILM COATED ORAL DAILY
Qty: 90 TABLET | Refills: 1 | Status: SHIPPED | OUTPATIENT
Start: 2025-01-20

## 2025-02-26 ENCOUNTER — TELEPHONE (OUTPATIENT)
Dept: ENDOCRINOLOGY | Age: 70
End: 2025-02-26

## 2025-02-26 NOTE — TELEPHONE ENCOUNTER
LMOM informing Mrs. Dennis her  [facility] pt assistance has arrived for Tresiba and Novolog flexpen .

## 2025-04-18 ENCOUNTER — TELEPHONE (OUTPATIENT)
Dept: ENDOCRINOLOGY | Age: 70
End: 2025-04-18

## 2025-04-18 NOTE — TELEPHONE ENCOUNTER
Fax from [facility]     Pt is eligible to receive a reorder on Tresiba    120 day supply of medication listed about will be fullfilled and should arrive within 10-14 days

## 2025-05-09 ENCOUNTER — TELEPHONE (OUTPATIENT)
Dept: ENDOCRINOLOGY | Age: 70
End: 2025-05-09
Payer: MEDICARE

## 2025-05-09 DIAGNOSIS — Z79.4 CONTROLLED TYPE 2 DIABETES MELLITUS WITH COMPLICATION, WITH LONG-TERM CURRENT USE OF INSULIN (HCC): Primary | ICD-10-CM

## 2025-05-09 DIAGNOSIS — E11.8 CONTROLLED TYPE 2 DIABETES MELLITUS WITH COMPLICATION, WITH LONG-TERM CURRENT USE OF INSULIN (HCC): Primary | ICD-10-CM

## 2025-05-09 PROCEDURE — 95251 CONT GLUC MNTR ANALYSIS I&R: CPT | Performed by: INTERNAL MEDICINE

## 2025-05-09 NOTE — TELEPHONE ENCOUNTER
Please let Dr. Kelley knew that my readings from this last dexcom I’m using have not been accurate. I did not bottom out yesterday as it’s showing. Replacing sensor.

## 2025-05-09 NOTE — TELEPHONE ENCOUNTER
I have had to increase dosing of Novalog  to keep readings good.  I’m afraid I’ll run out before my refill  arrives. It’s refill date is 5/17 but probably will not be shipped until the end of May.  Can you order a small quantity through [employer] or change the prescription to [facility] so that I can get it sooner? Thanks Radha Dennis [phone]    I’ve been doing a sliding scale , but instead of needing 1 unit per carb I’m needing at least 2 units per carb. This might be partly because my kidney doctor needed to cut back on my Jardiance to 1/2 the dose age I was taking but I was already needing to increase my insulin somewhat before that.     Dexcom report attached. I can send a new prescription to [facility] to increase her Novolog, I just need to know what the new sig will be.

## 2025-05-13 RX ORDER — INSULIN ASPART 100 [IU]/ML
INJECTION, SOLUTION INTRAVENOUS; SUBCUTANEOUS
Qty: 5 ADJUSTABLE DOSE PRE-FILLED PEN SYRINGE | Refills: 3 | Status: SHIPPED | OUTPATIENT
Start: 2025-05-13

## 2025-05-13 NOTE — TELEPHONE ENCOUNTER
Diabetes Continuous Glucose Monitoring Report         Reason for Study:     - improve diabetic control without risk of hypoglycemia       Current Medication regimen:   Basal Bolus insulin regimen      CGMS Report     CGMS data collection was performed on 5/13/2025  Patient provided information on her  diet, activities and insulin dosing  during this period.   Data was available for 7+ days     Sensor Data Report:   - 12 AM to 6 AM: Overnight blood glucose pattern shows stable glycemia  - 6   AM to 10 AM:  Post breakfast hyperglycemia is noted  --10AM to 5 PM : No hypoglycemia observed during this time.  - 5   PM to 8 PM: Post meal hyperglycemia is noted       Average reading 143 mg/dL     Standard Dev/coefficient of variation 39    % of time <70 mg/dL 1   %   % of time >180  mg/dL 16  %   % of time within range  83 %  Number of hypoglycemia episodes noted: 0     Impression:   CGMS shows stable glycemia overnight with postprandial hyperglycemia     Recommendation:      Patient was advised to make the following changes in her diabetic regimen  Reduce long acting insulin   Take meal boluses 10 minutes prior to eating

## 2025-05-15 RX ORDER — INSULIN LISPRO 100 [IU]/ML
INJECTION, SOLUTION INTRAVENOUS; SUBCUTANEOUS
Qty: 5 ADJUSTABLE DOSE PRE-FILLED PEN SYRINGE | Refills: 3 | Status: SHIPPED | OUTPATIENT
Start: 2025-05-15

## 2025-05-15 NOTE — TELEPHONE ENCOUNTER
Medication:   Requested Prescriptions     Pending Prescriptions Disp Refills    insulin lispro, 1 Unit Dial, (HUMALOG KWIKPEN) 100 UNIT/ML SOPN 5 Adjustable Dose Pre-filled Pen Syringe 3     Sig: Use sliding scale insulin 10 min before meals using Carb to insulin ratio if 2:1 aprox upto 15 units TID       Last Filled:      Patient Phone Number: [phone] (home)     Last appt: 11/18/2024  Next appt: 6/16/2025    Last Labs DM:   Lab Results   Component Value Date/Time    LABA1C 6.8 11/14/2024 08:51 AM

## 2025-05-15 NOTE — TELEPHONE ENCOUNTER
Fax from [facility] on [address]    Please resend as Humalog for insurance    Re: Novolog Flexpen 100u/ml inj

## 2025-05-29 ENCOUNTER — TELEPHONE (OUTPATIENT)
Dept: ENDOCRINOLOGY | Age: 70
End: 2025-05-29

## 2025-06-12 DIAGNOSIS — Z79.4 CONTROLLED TYPE 2 DIABETES MELLITUS WITH COMPLICATION, WITH LONG-TERM CURRENT USE OF INSULIN (HCC): ICD-10-CM

## 2025-06-12 DIAGNOSIS — E78.2 MIXED HYPERLIPIDEMIA: ICD-10-CM

## 2025-06-12 DIAGNOSIS — E11.8 CONTROLLED TYPE 2 DIABETES MELLITUS WITH COMPLICATION, WITH LONG-TERM CURRENT USE OF INSULIN (HCC): ICD-10-CM

## 2025-06-12 DIAGNOSIS — E66.9 OBESITY (BMI 35.0-39.9 WITHOUT COMORBIDITY): ICD-10-CM

## 2025-06-12 LAB
ALBUMIN SERPL-MCNC: 4.2 G/DL (ref 3.4–5)
ALBUMIN/GLOB SERPL: 1.9 {RATIO} (ref 1.1–2.2)
ALP SERPL-CCNC: 106 U/L (ref 40–129)
ALT SERPL-CCNC: 43 U/L (ref 10–40)
ANION GAP SERPL CALCULATED.3IONS-SCNC: 12 MMOL/L (ref 3–16)
AST SERPL-CCNC: 37 U/L (ref 15–37)
BILIRUB SERPL-MCNC: 0.5 MG/DL (ref 0–1)
BUN SERPL-MCNC: 26 MG/DL (ref 7–20)
CALCIUM SERPL-MCNC: 9.5 MG/DL (ref 8.3–10.6)
CHLORIDE SERPL-SCNC: 99 MMOL/L (ref 99–110)
CHOLEST SERPL-MCNC: 127 MG/DL (ref 0–199)
CO2 SERPL-SCNC: 29 MMOL/L (ref 21–32)
CREAT SERPL-MCNC: 1.4 MG/DL (ref 0.6–1.2)
EST. AVERAGE GLUCOSE BLD GHB EST-MCNC: 128.4 MG/DL
GFR SERPLBLD CREATININE-BSD FMLA CKD-EPI: 40 ML/MIN/{1.73_M2}
GLUCOSE SERPL-MCNC: 96 MG/DL (ref 70–99)
HBA1C MFR BLD: 6.1 %
HDLC SERPL-MCNC: 49 MG/DL (ref 40–60)
LDLC SERPL CALC-MCNC: 59 MG/DL
POTASSIUM SERPL-SCNC: 3.9 MMOL/L (ref 3.5–5.1)
PROT SERPL-MCNC: 6.4 G/DL (ref 6.4–8.2)
SODIUM SERPL-SCNC: 140 MMOL/L (ref 136–145)
TRIGL SERPL-MCNC: 97 MG/DL (ref 0–150)
TSH SERPL DL<=0.005 MIU/L-ACNC: 0.58 UIU/ML (ref 0.27–4.2)
VLDLC SERPL CALC-MCNC: 19 MG/DL

## 2025-06-16 ENCOUNTER — OFFICE VISIT (OUTPATIENT)
Dept: ENDOCRINOLOGY | Age: 70
End: 2025-06-16
Payer: MEDICARE

## 2025-06-16 VITALS
HEIGHT: 61 IN | DIASTOLIC BLOOD PRESSURE: 80 MMHG | SYSTOLIC BLOOD PRESSURE: 129 MMHG | HEART RATE: 66 BPM | WEIGHT: 166 LBS | BODY MASS INDEX: 31.34 KG/M2

## 2025-06-16 DIAGNOSIS — E66.9 OBESITY (BMI 35.0-39.9 WITHOUT COMORBIDITY): ICD-10-CM

## 2025-06-16 DIAGNOSIS — Z79.4 CONTROLLED TYPE 2 DIABETES MELLITUS WITH STAGE 3 CHRONIC KIDNEY DISEASE, WITH LONG-TERM CURRENT USE OF INSULIN (HCC): Primary | ICD-10-CM

## 2025-06-16 DIAGNOSIS — N18.31 STAGE 3A CHRONIC KIDNEY DISEASE (HCC): ICD-10-CM

## 2025-06-16 DIAGNOSIS — E11.22 CONTROLLED TYPE 2 DIABETES MELLITUS WITH STAGE 3 CHRONIC KIDNEY DISEASE, WITH LONG-TERM CURRENT USE OF INSULIN (HCC): Primary | ICD-10-CM

## 2025-06-16 DIAGNOSIS — N18.30 CONTROLLED TYPE 2 DIABETES MELLITUS WITH STAGE 3 CHRONIC KIDNEY DISEASE, WITH LONG-TERM CURRENT USE OF INSULIN (HCC): Primary | ICD-10-CM

## 2025-06-16 DIAGNOSIS — I10 ESSENTIAL HYPERTENSION: ICD-10-CM

## 2025-06-16 DIAGNOSIS — E78.2 MIXED HYPERLIPIDEMIA: ICD-10-CM

## 2025-06-16 PROCEDURE — 3017F COLORECTAL CA SCREEN DOC REV: CPT | Performed by: INTERNAL MEDICINE

## 2025-06-16 PROCEDURE — 1123F ACP DISCUSS/DSCN MKR DOCD: CPT | Performed by: INTERNAL MEDICINE

## 2025-06-16 PROCEDURE — 1036F TOBACCO NON-USER: CPT | Performed by: INTERNAL MEDICINE

## 2025-06-16 PROCEDURE — G8427 DOCREV CUR MEDS BY ELIG CLIN: HCPCS | Performed by: INTERNAL MEDICINE

## 2025-06-16 PROCEDURE — 3074F SYST BP LT 130 MM HG: CPT | Performed by: INTERNAL MEDICINE

## 2025-06-16 PROCEDURE — 1090F PRES/ABSN URINE INCON ASSESS: CPT | Performed by: INTERNAL MEDICINE

## 2025-06-16 PROCEDURE — G8417 CALC BMI ABV UP PARAM F/U: HCPCS | Performed by: INTERNAL MEDICINE

## 2025-06-16 PROCEDURE — 1160F RVW MEDS BY RX/DR IN RCRD: CPT | Performed by: INTERNAL MEDICINE

## 2025-06-16 PROCEDURE — 2022F DILAT RTA XM EVC RTNOPTHY: CPT | Performed by: INTERNAL MEDICINE

## 2025-06-16 PROCEDURE — 3079F DIAST BP 80-89 MM HG: CPT | Performed by: INTERNAL MEDICINE

## 2025-06-16 PROCEDURE — 3044F HG A1C LEVEL LT 7.0%: CPT | Performed by: INTERNAL MEDICINE

## 2025-06-16 PROCEDURE — G8400 PT W/DXA NO RESULTS DOC: HCPCS | Performed by: INTERNAL MEDICINE

## 2025-06-16 PROCEDURE — 95251 CONT GLUC MNTR ANALYSIS I&R: CPT | Performed by: INTERNAL MEDICINE

## 2025-06-16 PROCEDURE — 99214 OFFICE O/P EST MOD 30 MIN: CPT | Performed by: INTERNAL MEDICINE

## 2025-06-16 PROCEDURE — 1159F MED LIST DOCD IN RCRD: CPT | Performed by: INTERNAL MEDICINE

## 2025-06-16 RX ORDER — NORTRIPTYLINE HYDROCHLORIDE 25 MG/1
25 CAPSULE ORAL NIGHTLY
COMMUNITY
Start: 2025-02-09

## 2025-06-16 RX ORDER — RANOLAZINE 500 MG/1
500 TABLET, EXTENDED RELEASE ORAL 2 TIMES DAILY
COMMUNITY
Start: 2025-04-28

## 2025-06-16 NOTE — PROGRESS NOTES
Take 1 tablet by mouth daily      DULoxetine (CYMBALTA) 60 MG extended release capsule Take 1 capsule by mouth 2 times daily      fexofenadine (ALLEGRA) 180 MG tablet Take 1 tablet by mouth daily Pt takes PRN      furosemide (LASIX) 40 MG tablet Take by mouth daily Alternate daily 40 mg with 80 mg every other day      Multiple Vitamins-Minerals (MULTIVITAMIN PO) Take  by mouth.       No current facility-administered medications for this visit.     No Known Allergies  Family Status   Relation Name Status    Father  (Not Specified)    Sister  (Not Specified)    MGM  (Not Specified)   No partnership data on file       OBJECTIVE:  /80   Pulse 66   Ht 1.549 m (5' 1\")   Wt 75.3 kg (166 lb)   BMI 31.37 kg/m²    Wt Readings from Last 3 Encounters:   06/16/25 75.3 kg (166 lb)   05/13/24 75.8 kg (167 lb)   11/06/23 76.8 kg (169 lb 6.4 oz)       Constitutional: no acute distress, well appearing, well nourished  Psychiatric: oriented to person, place and time, judgement, insight and normal, recent and remote memory and intact and mood, affect are normal  Skin: skin and subcutaneous tissue is normal without mass,   Head and Face: examination of head and face revealed no abnormalities  Eyes: no lid or conjunctival swelling, no erythema or discharge, pupils are normal,   Ears/Nose: external inspection of ears and nose revealed no abnormalities, hearing is grossly normal  Oropharynx/Mouth/Face: lips, tongue and gums are normal with no lesions, the voice quality was normal  Neck: neck is supple and symmetric, with midline trachea and no masses, thyroid is normal although difficult to palpate due to body habitus  Pulmonary: no increased work of breathing or signs of respiratory distress, lungs are clear to auscultation  Cardiovascular: normal heart rate and rhythm, normal S1 and S2,   Musculoskeletal: normal gait and station,   Neurological: normal coordination, normal general cortical function          Lab Results

## 2025-07-07 ENCOUNTER — TELEPHONE (OUTPATIENT)
Dept: ENDOCRINOLOGY | Age: 70
End: 2025-07-07

## 2025-07-07 NOTE — TELEPHONE ENCOUNTER
Pt states her insulin novalog and trestiba was delivered to [facility] and signed by itzel on 05/29/2025. Do you have it and when can the Pt pick it up? Pt ph# [phone]

## 2025-07-21 ENCOUNTER — TELEPHONE (OUTPATIENT)
Dept: ENDOCRINOLOGY | Age: 70
End: 2025-07-21

## 2025-07-21 NOTE — TELEPHONE ENCOUNTER
Fax from [facility]    120 day supply of medication will be fullfilled within 10-14 days    Re: Tresiba & Novolog Flexpen

## 2025-07-30 RX ORDER — LEVOTHYROXINE SODIUM 88 UG/1
88 TABLET ORAL DAILY
Qty: 90 TABLET | Refills: 0 | Status: SHIPPED | OUTPATIENT
Start: 2025-07-30